# Patient Record
Sex: MALE | Race: WHITE | NOT HISPANIC OR LATINO
[De-identification: names, ages, dates, MRNs, and addresses within clinical notes are randomized per-mention and may not be internally consistent; named-entity substitution may affect disease eponyms.]

---

## 2020-02-10 PROBLEM — Z00.00 ENCOUNTER FOR PREVENTIVE HEALTH EXAMINATION: Status: ACTIVE | Noted: 2020-02-10

## 2020-02-27 ENCOUNTER — APPOINTMENT (OUTPATIENT)
Dept: GASTROENTEROLOGY | Facility: CLINIC | Age: 75
End: 2020-02-27

## 2020-08-27 ENCOUNTER — OUTPATIENT (OUTPATIENT)
Dept: OUTPATIENT SERVICES | Facility: HOSPITAL | Age: 75
LOS: 1 days | Discharge: HOME | End: 2020-08-27
Payer: MEDICARE

## 2020-08-27 ENCOUNTER — RESULT REVIEW (OUTPATIENT)
Age: 75
End: 2020-08-27

## 2020-08-27 VITALS
DIASTOLIC BLOOD PRESSURE: 83 MMHG | TEMPERATURE: 98 F | OXYGEN SATURATION: 96 % | HEIGHT: 67 IN | RESPIRATION RATE: 16 BRPM | SYSTOLIC BLOOD PRESSURE: 135 MMHG | WEIGHT: 186.07 LBS | HEART RATE: 68 BPM

## 2020-08-27 DIAGNOSIS — Z98.890 OTHER SPECIFIED POSTPROCEDURAL STATES: Chronic | ICD-10-CM

## 2020-08-27 DIAGNOSIS — Z01.818 ENCOUNTER FOR OTHER PREPROCEDURAL EXAMINATION: ICD-10-CM

## 2020-08-27 DIAGNOSIS — M75.101 UNSPECIFIED ROTATOR CUFF TEAR OR RUPTURE OF RIGHT SHOULDER, NOT SPECIFIED AS TRAUMATIC: ICD-10-CM

## 2020-08-27 LAB
ALBUMIN SERPL ELPH-MCNC: 4.6 G/DL — SIGNIFICANT CHANGE UP (ref 3.5–5.2)
ALP SERPL-CCNC: 52 U/L — SIGNIFICANT CHANGE UP (ref 30–115)
ALT FLD-CCNC: 9 U/L — SIGNIFICANT CHANGE UP (ref 0–41)
ANION GAP SERPL CALC-SCNC: 12 MMOL/L — SIGNIFICANT CHANGE UP (ref 7–14)
APTT BLD: 28 SEC — SIGNIFICANT CHANGE UP (ref 27–39.2)
AST SERPL-CCNC: 14 U/L — SIGNIFICANT CHANGE UP (ref 0–41)
BASOPHILS # BLD AUTO: 0.06 K/UL — SIGNIFICANT CHANGE UP (ref 0–0.2)
BASOPHILS NFR BLD AUTO: 1.4 % — HIGH (ref 0–1)
BILIRUB SERPL-MCNC: 0.7 MG/DL — SIGNIFICANT CHANGE UP (ref 0.2–1.2)
BUN SERPL-MCNC: 15 MG/DL — SIGNIFICANT CHANGE UP (ref 10–20)
CALCIUM SERPL-MCNC: 9.4 MG/DL — SIGNIFICANT CHANGE UP (ref 8.5–10.1)
CHLORIDE SERPL-SCNC: 102 MMOL/L — SIGNIFICANT CHANGE UP (ref 98–110)
CO2 SERPL-SCNC: 24 MMOL/L — SIGNIFICANT CHANGE UP (ref 17–32)
CREAT SERPL-MCNC: 0.7 MG/DL — SIGNIFICANT CHANGE UP (ref 0.7–1.5)
EOSINOPHIL # BLD AUTO: 0.26 K/UL — SIGNIFICANT CHANGE UP (ref 0–0.7)
EOSINOPHIL NFR BLD AUTO: 5.9 % — SIGNIFICANT CHANGE UP (ref 0–8)
GLUCOSE SERPL-MCNC: 79 MG/DL — SIGNIFICANT CHANGE UP (ref 70–99)
HCT VFR BLD CALC: 48.1 % — SIGNIFICANT CHANGE UP (ref 42–52)
HGB BLD-MCNC: 15.7 G/DL — SIGNIFICANT CHANGE UP (ref 14–18)
IMM GRANULOCYTES NFR BLD AUTO: 0.2 % — SIGNIFICANT CHANGE UP (ref 0.1–0.3)
INR BLD: 0.98 RATIO — SIGNIFICANT CHANGE UP (ref 0.65–1.3)
LYMPHOCYTES # BLD AUTO: 0.73 K/UL — LOW (ref 1.2–3.4)
LYMPHOCYTES # BLD AUTO: 16.6 % — LOW (ref 20.5–51.1)
MCHC RBC-ENTMCNC: 29.7 PG — SIGNIFICANT CHANGE UP (ref 27–31)
MCHC RBC-ENTMCNC: 32.6 G/DL — SIGNIFICANT CHANGE UP (ref 32–37)
MCV RBC AUTO: 91.1 FL — SIGNIFICANT CHANGE UP (ref 80–94)
MONOCYTES # BLD AUTO: 0.37 K/UL — SIGNIFICANT CHANGE UP (ref 0.1–0.6)
MONOCYTES NFR BLD AUTO: 8.4 % — SIGNIFICANT CHANGE UP (ref 1.7–9.3)
NEUTROPHILS # BLD AUTO: 2.96 K/UL — SIGNIFICANT CHANGE UP (ref 1.4–6.5)
NEUTROPHILS NFR BLD AUTO: 67.5 % — SIGNIFICANT CHANGE UP (ref 42.2–75.2)
NRBC # BLD: 0 /100 WBCS — SIGNIFICANT CHANGE UP (ref 0–0)
PLATELET # BLD AUTO: 176 K/UL — SIGNIFICANT CHANGE UP (ref 130–400)
POTASSIUM SERPL-MCNC: 4.6 MMOL/L — SIGNIFICANT CHANGE UP (ref 3.5–5)
POTASSIUM SERPL-SCNC: 4.6 MMOL/L — SIGNIFICANT CHANGE UP (ref 3.5–5)
PROT SERPL-MCNC: 6.7 G/DL — SIGNIFICANT CHANGE UP (ref 6–8)
PROTHROM AB SERPL-ACNC: 11.3 SEC — SIGNIFICANT CHANGE UP (ref 9.95–12.87)
RBC # BLD: 5.28 M/UL — SIGNIFICANT CHANGE UP (ref 4.7–6.1)
RBC # FLD: 13.2 % — SIGNIFICANT CHANGE UP (ref 11.5–14.5)
SODIUM SERPL-SCNC: 138 MMOL/L — SIGNIFICANT CHANGE UP (ref 135–146)
WBC # BLD: 4.39 K/UL — LOW (ref 4.8–10.8)
WBC # FLD AUTO: 4.39 K/UL — LOW (ref 4.8–10.8)

## 2020-08-27 PROCEDURE — 93010 ELECTROCARDIOGRAM REPORT: CPT

## 2020-08-27 PROCEDURE — 71046 X-RAY EXAM CHEST 2 VIEWS: CPT | Mod: 26

## 2020-08-27 NOTE — H&P PST ADULT - NSICDXPASTMEDICALHX_GEN_ALL_CORE_FT
PAST MEDICAL HISTORY:  Back pain herniated disc    Grant esophagus     HTN (hypertension)     Kidney stone

## 2020-08-27 NOTE — H&P PST ADULT - HISTORY OF PRESENT ILLNESS
Pt states he has multiple tear in rotator cuff which causes him pain. Denies any chest pain, difficulty breathing, SOB, palpitations, dysuria, URI, or any other infections in the last 2 weeks. Denies any recent travel, contact, or exposure to any persons with known or suspected COVID-19. Pt also denies COVID testing within the last 2 weeks. Pt advised to self quarantine until day of procedure. Exercise tolerance of 1-2 flights of stairs without dyspnea. NOÉ reviewed with patient.     Anesthesia Alert  NO--Difficult Airway  NO--History of neck surgery or radiation  NO--Limited ROM of neck  NO--History of Malignant hyperthermia  NO--No personal or family history of Pseudocholinesterase deficiency.  NO--Prior Anesthesia Complication  NO--Latex Allergy  NO--Loose teeth  NO--History of Rheumatoid Arthritis  NO--NOÉ  NO--Other_____

## 2020-08-27 NOTE — H&P PST ADULT - NSANTHOSAYNRD_GEN_A_CORE
No. NOÉ screening performed.  STOP BANG Legend: 0-2 = LOW Risk; 3-4 = INTERMEDIATE Risk; 5-8 = HIGH Risk

## 2020-08-27 NOTE — H&P PST ADULT - REASON FOR ADMISSION
76 yo male presents for PAST in preparation for right shoulder arthroscopy decompression debridement possible rotator cuff repair possible open bicep tenodesis on 9/10/2020.

## 2020-09-07 ENCOUNTER — OUTPATIENT (OUTPATIENT)
Dept: OUTPATIENT SERVICES | Facility: HOSPITAL | Age: 75
LOS: 1 days | Discharge: HOME | End: 2020-09-07

## 2020-09-07 DIAGNOSIS — Z98.890 OTHER SPECIFIED POSTPROCEDURAL STATES: Chronic | ICD-10-CM

## 2020-09-07 DIAGNOSIS — Z11.59 ENCOUNTER FOR SCREENING FOR OTHER VIRAL DISEASES: ICD-10-CM

## 2020-09-07 PROBLEM — K22.70 BARRETT'S ESOPHAGUS WITHOUT DYSPLASIA: Chronic | Status: ACTIVE | Noted: 2020-08-27

## 2020-09-07 PROBLEM — I10 ESSENTIAL (PRIMARY) HYPERTENSION: Chronic | Status: ACTIVE | Noted: 2020-08-27

## 2020-09-07 PROBLEM — M54.9 DORSALGIA, UNSPECIFIED: Chronic | Status: ACTIVE | Noted: 2020-08-27

## 2020-09-07 PROBLEM — N20.0 CALCULUS OF KIDNEY: Chronic | Status: ACTIVE | Noted: 2020-08-27

## 2020-09-10 ENCOUNTER — RESULT REVIEW (OUTPATIENT)
Age: 75
End: 2020-09-10

## 2020-09-10 ENCOUNTER — OUTPATIENT (OUTPATIENT)
Dept: OUTPATIENT SERVICES | Facility: HOSPITAL | Age: 75
LOS: 1 days | Discharge: HOME | End: 2020-09-10
Payer: MEDICARE

## 2020-09-10 VITALS
WEIGHT: 186.07 LBS | SYSTOLIC BLOOD PRESSURE: 147 MMHG | OXYGEN SATURATION: 100 % | TEMPERATURE: 98 F | RESPIRATION RATE: 18 BRPM | HEART RATE: 63 BPM | DIASTOLIC BLOOD PRESSURE: 81 MMHG | HEIGHT: 67 IN

## 2020-09-10 VITALS
SYSTOLIC BLOOD PRESSURE: 119 MMHG | HEART RATE: 67 BPM | DIASTOLIC BLOOD PRESSURE: 58 MMHG | RESPIRATION RATE: 22 BRPM | OXYGEN SATURATION: 97 %

## 2020-09-10 DIAGNOSIS — Z98.890 OTHER SPECIFIED POSTPROCEDURAL STATES: Chronic | ICD-10-CM

## 2020-09-10 PROCEDURE — 88304 TISSUE EXAM BY PATHOLOGIST: CPT | Mod: 26

## 2020-09-10 RX ORDER — ONDANSETRON 8 MG/1
4 TABLET, FILM COATED ORAL ONCE
Refills: 0 | Status: DISCONTINUED | OUTPATIENT
Start: 2020-09-10 | End: 2020-09-24

## 2020-09-10 RX ORDER — AMLODIPINE BESYLATE AND BENAZEPRIL HYDROCHLORIDE 10; 20 MG/1; MG/1
1 CAPSULE ORAL
Qty: 0 | Refills: 0 | DISCHARGE

## 2020-09-10 RX ORDER — ZOLPIDEM TARTRATE 10 MG/1
1 TABLET ORAL
Qty: 0 | Refills: 0 | DISCHARGE

## 2020-09-10 RX ORDER — FINASTERIDE 5 MG/1
1 TABLET, FILM COATED ORAL
Qty: 0 | Refills: 0 | DISCHARGE

## 2020-09-10 RX ORDER — TAMSULOSIN HYDROCHLORIDE 0.4 MG/1
1 CAPSULE ORAL
Qty: 0 | Refills: 0 | DISCHARGE

## 2020-09-10 RX ORDER — FAMOTIDINE 10 MG/ML
1 INJECTION INTRAVENOUS
Qty: 0 | Refills: 0 | DISCHARGE

## 2020-09-10 RX ORDER — OXYCODONE AND ACETAMINOPHEN 5; 325 MG/1; MG/1
1 TABLET ORAL EVERY 4 HOURS
Refills: 0 | Status: DISCONTINUED | OUTPATIENT
Start: 2020-09-10 | End: 2020-09-10

## 2020-09-10 RX ORDER — LANSOPRAZOLE 15 MG/1
1 CAPSULE, DELAYED RELEASE ORAL
Qty: 0 | Refills: 0 | DISCHARGE

## 2020-09-10 RX ORDER — MIRABEGRON 50 MG/1
1 TABLET, EXTENDED RELEASE ORAL
Qty: 0 | Refills: 0 | DISCHARGE

## 2020-09-10 RX ORDER — HYDROMORPHONE HYDROCHLORIDE 2 MG/ML
0.5 INJECTION INTRAMUSCULAR; INTRAVENOUS; SUBCUTANEOUS
Refills: 0 | Status: DISCONTINUED | OUTPATIENT
Start: 2020-09-10 | End: 2020-09-24

## 2020-09-10 RX ORDER — ESCITALOPRAM OXALATE 10 MG/1
1 TABLET, FILM COATED ORAL
Qty: 0 | Refills: 0 | DISCHARGE

## 2020-09-10 RX ORDER — HYDROMORPHONE HYDROCHLORIDE 2 MG/ML
0.5 INJECTION INTRAMUSCULAR; INTRAVENOUS; SUBCUTANEOUS
Refills: 0 | Status: DISCONTINUED | OUTPATIENT
Start: 2020-09-10 | End: 2020-09-10

## 2020-09-10 RX ORDER — SODIUM CHLORIDE 9 MG/ML
1000 INJECTION, SOLUTION INTRAVENOUS
Refills: 0 | Status: DISCONTINUED | OUTPATIENT
Start: 2020-09-10 | End: 2020-09-24

## 2020-09-10 RX ADMIN — SODIUM CHLORIDE 100 MILLILITER(S): 9 INJECTION, SOLUTION INTRAVENOUS at 10:28

## 2020-09-10 RX ADMIN — OXYCODONE AND ACETAMINOPHEN 1 TABLET(S): 5; 325 TABLET ORAL at 13:09

## 2020-09-10 RX ADMIN — HYDROMORPHONE HYDROCHLORIDE 0.5 MILLIGRAM(S): 2 INJECTION INTRAMUSCULAR; INTRAVENOUS; SUBCUTANEOUS at 13:10

## 2020-09-10 RX ADMIN — HYDROMORPHONE HYDROCHLORIDE 0.5 MILLIGRAM(S): 2 INJECTION INTRAMUSCULAR; INTRAVENOUS; SUBCUTANEOUS at 10:48

## 2020-09-10 RX ADMIN — HYDROMORPHONE HYDROCHLORIDE 0.5 MILLIGRAM(S): 2 INJECTION INTRAMUSCULAR; INTRAVENOUS; SUBCUTANEOUS at 11:08

## 2020-09-10 NOTE — ASU DISCHARGE PLAN (ADULT/PEDIATRIC) - CARE PROVIDER_API CALL
Trevon Baez  Orthopaedic Surgery  [address] [place] [address]  Phone: [phone]  Fax: [phone]  Follow Up Time: 1 week

## 2020-09-10 NOTE — ASU DISCHARGE PLAN (ADULT/PEDIATRIC) - ASU DC SPECIAL INSTRUCTIONSFT
Post -Operative Shoulder Arthroscopy Instructions    Anesthesia:  - No alcoholic beverages, including beer and wine, for 24 hours or while on presecribed pain medications  - Do not make any important decisions or sign any legal documents  - Do not drive or operate machinary for 24 hours  - You are required upon discharge to leave the surgical center with a responsible adult who will drive you home    Surgery:  - Keep sling on until being seen in office.  You can move fingers, wrist and elbow while in sling  - Keep clean and dry for 3 days  - Remove dressing in 3 days and cover wounds with band-aids, you can then shower  - Take pain medication as prescribed  - Resume regular diet  - Follow-up in approximately 1 week    FOR QUESTIONS OR CONCERNS, CALL [phone]    Notify your doctor if you develop: fever, chills, excessive swelling, drainage, pain not controlled by pain medication, persistent numbness in hand or fingers     IF AN EMERGENCY ARISES , CALL 911 AND/OR GO TO THE EMERGENCY ROOM    Dr. Baez  [phone]

## 2020-09-10 NOTE — PRE-ANESTHESIA EVALUATION ADULT - NSANTHADDINFOFT_GEN_ALL_CORE
Procedure/Risks explained for GA with ETT + routine monitoring. Patient understands stated anesthetic plan and agrees to proceed.

## 2020-09-13 DIAGNOSIS — M25.811 OTHER SPECIFIED JOINT DISORDERS, RIGHT SHOULDER: ICD-10-CM

## 2020-09-13 DIAGNOSIS — M75.121 COMPLETE ROTATOR CUFF TEAR OR RUPTURE OF RIGHT SHOULDER, NOT SPECIFIED AS TRAUMATIC: ICD-10-CM

## 2020-09-13 DIAGNOSIS — Z87.891 PERSONAL HISTORY OF NICOTINE DEPENDENCE: ICD-10-CM

## 2020-09-13 DIAGNOSIS — Z87.442 PERSONAL HISTORY OF URINARY CALCULI: ICD-10-CM

## 2020-09-13 DIAGNOSIS — Z88.6 ALLERGY STATUS TO ANALGESIC AGENT: ICD-10-CM

## 2020-09-13 DIAGNOSIS — M65.811 OTHER SYNOVITIS AND TENOSYNOVITIS, RIGHT SHOULDER: ICD-10-CM

## 2020-09-13 DIAGNOSIS — M24.111 OTHER ARTICULAR CARTILAGE DISORDERS, RIGHT SHOULDER: ICD-10-CM

## 2020-09-13 DIAGNOSIS — I10 ESSENTIAL (PRIMARY) HYPERTENSION: ICD-10-CM

## 2020-09-14 LAB — SURGICAL PATHOLOGY STUDY: SIGNIFICANT CHANGE UP

## 2021-10-11 ENCOUNTER — INPATIENT (INPATIENT)
Facility: HOSPITAL | Age: 76
LOS: 5 days | Discharge: HOME HEALTH | End: 2021-10-17
Attending: STUDENT IN AN ORGANIZED HEALTH CARE EDUCATION/TRAINING PROGRAM | Admitting: STUDENT IN AN ORGANIZED HEALTH CARE EDUCATION/TRAINING PROGRAM
Payer: MEDICARE

## 2021-10-11 VITALS
HEART RATE: 75 BPM | OXYGEN SATURATION: 95 % | WEIGHT: 190.04 LBS | SYSTOLIC BLOOD PRESSURE: 95 MMHG | RESPIRATION RATE: 20 BRPM | TEMPERATURE: 98 F | DIASTOLIC BLOOD PRESSURE: 53 MMHG | HEIGHT: 67 IN

## 2021-10-11 DIAGNOSIS — Z98.890 OTHER SPECIFIED POSTPROCEDURAL STATES: Chronic | ICD-10-CM

## 2021-10-11 LAB
ALBUMIN SERPL ELPH-MCNC: 3.6 G/DL — SIGNIFICANT CHANGE UP (ref 3.5–5.2)
ALP SERPL-CCNC: 49 U/L — SIGNIFICANT CHANGE UP (ref 30–115)
ALT FLD-CCNC: 25 U/L — SIGNIFICANT CHANGE UP (ref 0–41)
ANION GAP SERPL CALC-SCNC: 16 MMOL/L — HIGH (ref 7–14)
AST SERPL-CCNC: 52 U/L — HIGH (ref 0–41)
BASOPHILS # BLD AUTO: 0.01 K/UL — SIGNIFICANT CHANGE UP (ref 0–0.2)
BASOPHILS NFR BLD AUTO: 0.1 % — SIGNIFICANT CHANGE UP (ref 0–1)
BILIRUB SERPL-MCNC: 0.6 MG/DL — SIGNIFICANT CHANGE UP (ref 0.2–1.2)
BUN SERPL-MCNC: 35 MG/DL — HIGH (ref 10–20)
CALCIUM SERPL-MCNC: 8.4 MG/DL — LOW (ref 8.5–10.1)
CHLORIDE SERPL-SCNC: 95 MMOL/L — LOW (ref 98–110)
CO2 SERPL-SCNC: 22 MMOL/L — SIGNIFICANT CHANGE UP (ref 17–32)
CREAT SERPL-MCNC: 1.4 MG/DL — SIGNIFICANT CHANGE UP (ref 0.7–1.5)
D DIMER BLD IA.RAPID-MCNC: 135 NG/ML DDU — SIGNIFICANT CHANGE UP (ref 0–230)
EOSINOPHIL # BLD AUTO: 0 K/UL — SIGNIFICANT CHANGE UP (ref 0–0.7)
EOSINOPHIL NFR BLD AUTO: 0 % — SIGNIFICANT CHANGE UP (ref 0–8)
GLUCOSE SERPL-MCNC: 124 MG/DL — HIGH (ref 70–99)
HCT VFR BLD CALC: 45.1 % — SIGNIFICANT CHANGE UP (ref 42–52)
HGB BLD-MCNC: 15.4 G/DL — SIGNIFICANT CHANGE UP (ref 14–18)
IMM GRANULOCYTES NFR BLD AUTO: 0.5 % — HIGH (ref 0.1–0.3)
LYMPHOCYTES # BLD AUTO: 0.32 K/UL — LOW (ref 1.2–3.4)
LYMPHOCYTES # BLD AUTO: 3.8 % — LOW (ref 20.5–51.1)
MCHC RBC-ENTMCNC: 29.6 PG — SIGNIFICANT CHANGE UP (ref 27–31)
MCHC RBC-ENTMCNC: 34.1 G/DL — SIGNIFICANT CHANGE UP (ref 32–37)
MCV RBC AUTO: 86.7 FL — SIGNIFICANT CHANGE UP (ref 80–94)
MONOCYTES # BLD AUTO: 0.27 K/UL — SIGNIFICANT CHANGE UP (ref 0.1–0.6)
MONOCYTES NFR BLD AUTO: 3.2 % — SIGNIFICANT CHANGE UP (ref 1.7–9.3)
NEUTROPHILS # BLD AUTO: 7.81 K/UL — HIGH (ref 1.4–6.5)
NEUTROPHILS NFR BLD AUTO: 92.4 % — HIGH (ref 42.2–75.2)
NRBC # BLD: 0 /100 WBCS — SIGNIFICANT CHANGE UP (ref 0–0)
PLATELET # BLD AUTO: 137 K/UL — SIGNIFICANT CHANGE UP (ref 130–400)
POTASSIUM SERPL-MCNC: 3.9 MMOL/L — SIGNIFICANT CHANGE UP (ref 3.5–5)
POTASSIUM SERPL-SCNC: 3.9 MMOL/L — SIGNIFICANT CHANGE UP (ref 3.5–5)
PROT SERPL-MCNC: 6.2 G/DL — SIGNIFICANT CHANGE UP (ref 6–8)
RBC # BLD: 5.2 M/UL — SIGNIFICANT CHANGE UP (ref 4.7–6.1)
RBC # FLD: 13.3 % — SIGNIFICANT CHANGE UP (ref 11.5–14.5)
SARS-COV-2 RNA SPEC QL NAA+PROBE: DETECTED
SODIUM SERPL-SCNC: 133 MMOL/L — LOW (ref 135–146)
WBC # BLD: 8.45 K/UL — SIGNIFICANT CHANGE UP (ref 4.8–10.8)
WBC # FLD AUTO: 8.45 K/UL — SIGNIFICANT CHANGE UP (ref 4.8–10.8)

## 2021-10-11 PROCEDURE — 99285 EMERGENCY DEPT VISIT HI MDM: CPT | Mod: CS,GC

## 2021-10-11 PROCEDURE — 71045 X-RAY EXAM CHEST 1 VIEW: CPT | Mod: 26

## 2021-10-11 PROCEDURE — 93010 ELECTROCARDIOGRAM REPORT: CPT

## 2021-10-11 NOTE — ED PROVIDER NOTE - PHYSICAL EXAMINATION
CONSTITUTIONAL: Well-developed; well-nourished; in no acute distress.   SKIN: warm, dry  HEAD: Normocephalic; atraumatic.  EYES: no conjunctival injection. PERRL.   ENT: No nasal discharge; airway clear.  NECK: Supple; non tender.  CARD: S1, S2 normal; no murmurs, gallops, or rubs. Regular rate and rhythm.   RESP: B/l crackles  ABD: soft ntnd  EXT: Normal ROM.  No clubbing, cyanosis or edema.   LYMPH: No acute cervical adenopathy.  NEURO: Alert, oriented, grossly unremarkable  PSYCH: Cooperative, appropriate.

## 2021-10-11 NOTE — ED PROVIDER NOTE - OBJECTIVE STATEMENT
76y M pmh HTN presenting with hypoxia. Positive COVID dxed on Wednesday. +diarrhea +dizziness +Fevers +weakness all started October 2nd. Checked pulse ox today at rest and it was in the low 80s. No leg swelling. No chest pain or shortness of breath. No n/v. No abdominal pain. Former smoker. Fully vaccinated since March 2021. +cough

## 2021-10-11 NOTE — ED PROVIDER NOTE - NS ED ROS FT
Eyes:  No visual changes, eye pain or discharge.  ENMT:  No hearing changes, pain, no sore throat or runny nose, no difficulty swallowing  Cardiac:  No chest pain, SOB or edema. No chest pain with exertion.  Respiratory:  see HPI  GI:  see HPI  :  No dysuria, frequency or burning.  MS: see HPI  Neuro: see HPI  Skin:  No skin rash.   Endocrine: No history of thyroid disease or diabetes.

## 2021-10-12 LAB
ALBUMIN SERPL ELPH-MCNC: 3.6 G/DL — SIGNIFICANT CHANGE UP (ref 3.5–5.2)
ALP SERPL-CCNC: 48 U/L — SIGNIFICANT CHANGE UP (ref 30–115)
ALT FLD-CCNC: 23 U/L — SIGNIFICANT CHANGE UP (ref 0–41)
ANION GAP SERPL CALC-SCNC: 16 MMOL/L — HIGH (ref 7–14)
ANISOCYTOSIS BLD QL: SLIGHT — SIGNIFICANT CHANGE UP
AST SERPL-CCNC: 44 U/L — HIGH (ref 0–41)
BASOPHILS # BLD AUTO: 0 K/UL — SIGNIFICANT CHANGE UP (ref 0–0.2)
BASOPHILS NFR BLD AUTO: 0 % — SIGNIFICANT CHANGE UP (ref 0–1)
BILIRUB SERPL-MCNC: 0.6 MG/DL — SIGNIFICANT CHANGE UP (ref 0.2–1.2)
BUN SERPL-MCNC: 34 MG/DL — HIGH (ref 10–20)
BURR CELLS BLD QL SMEAR: PRESENT — SIGNIFICANT CHANGE UP
CALCIUM SERPL-MCNC: 8.4 MG/DL — LOW (ref 8.5–10.1)
CHLORIDE SERPL-SCNC: 99 MMOL/L — SIGNIFICANT CHANGE UP (ref 98–110)
CO2 SERPL-SCNC: 21 MMOL/L — SIGNIFICANT CHANGE UP (ref 17–32)
CREAT SERPL-MCNC: 1 MG/DL — SIGNIFICANT CHANGE UP (ref 0.7–1.5)
CRP SERPL-MCNC: 273 MG/L — HIGH
EOSINOPHIL # BLD AUTO: 0.06 K/UL — SIGNIFICANT CHANGE UP (ref 0–0.7)
EOSINOPHIL NFR BLD AUTO: 0.9 % — SIGNIFICANT CHANGE UP (ref 0–8)
FERRITIN SERPL-MCNC: 1545 NG/ML — HIGH (ref 30–400)
GIANT PLATELETS BLD QL SMEAR: PRESENT — SIGNIFICANT CHANGE UP
GLUCOSE SERPL-MCNC: 103 MG/DL — HIGH (ref 70–99)
HCT VFR BLD CALC: 44.9 % — SIGNIFICANT CHANGE UP (ref 42–52)
HGB BLD-MCNC: 15.3 G/DL — SIGNIFICANT CHANGE UP (ref 14–18)
LYMPHOCYTES # BLD AUTO: 0.38 K/UL — LOW (ref 1.2–3.4)
LYMPHOCYTES # BLD AUTO: 5.9 % — LOW (ref 20.5–51.1)
MAGNESIUM SERPL-MCNC: 2.7 MG/DL — HIGH (ref 1.8–2.4)
MANUAL SMEAR VERIFICATION: SIGNIFICANT CHANGE UP
MCHC RBC-ENTMCNC: 29.7 PG — SIGNIFICANT CHANGE UP (ref 27–31)
MCHC RBC-ENTMCNC: 34.1 G/DL — SIGNIFICANT CHANGE UP (ref 32–37)
MCV RBC AUTO: 87 FL — SIGNIFICANT CHANGE UP (ref 80–94)
MICROCYTES BLD QL: SLIGHT — SIGNIFICANT CHANGE UP
MONOCYTES # BLD AUTO: 0.11 K/UL — SIGNIFICANT CHANGE UP (ref 0.1–0.6)
MONOCYTES NFR BLD AUTO: 1.7 % — SIGNIFICANT CHANGE UP (ref 1.7–9.3)
NEUTROPHILS # BLD AUTO: 5.92 K/UL — SIGNIFICANT CHANGE UP (ref 1.4–6.5)
NEUTROPHILS NFR BLD AUTO: 91.5 % — HIGH (ref 42.2–75.2)
OVALOCYTES BLD QL SMEAR: SIGNIFICANT CHANGE UP
PLAT MORPH BLD: NORMAL — SIGNIFICANT CHANGE UP
PLATELET # BLD AUTO: 141 K/UL — SIGNIFICANT CHANGE UP (ref 130–400)
POIKILOCYTOSIS BLD QL AUTO: SIGNIFICANT CHANGE UP
POLYCHROMASIA BLD QL SMEAR: SLIGHT — SIGNIFICANT CHANGE UP
POTASSIUM SERPL-MCNC: 3.8 MMOL/L — SIGNIFICANT CHANGE UP (ref 3.5–5)
POTASSIUM SERPL-SCNC: 3.8 MMOL/L — SIGNIFICANT CHANGE UP (ref 3.5–5)
PROCALCITONIN SERPL-MCNC: 0.56 NG/ML — HIGH (ref 0.02–0.1)
PROT SERPL-MCNC: 6.1 G/DL — SIGNIFICANT CHANGE UP (ref 6–8)
RBC # BLD: 5.16 M/UL — SIGNIFICANT CHANGE UP (ref 4.7–6.1)
RBC # FLD: 13.2 % — SIGNIFICANT CHANGE UP (ref 11.5–14.5)
RBC BLD AUTO: ABNORMAL
SMUDGE CELLS # BLD: PRESENT — SIGNIFICANT CHANGE UP
SODIUM SERPL-SCNC: 136 MMOL/L — SIGNIFICANT CHANGE UP (ref 135–146)
WBC # BLD: 6.47 K/UL — SIGNIFICANT CHANGE UP (ref 4.8–10.8)
WBC # FLD AUTO: 6.47 K/UL — SIGNIFICANT CHANGE UP (ref 4.8–10.8)

## 2021-10-12 PROCEDURE — 99221 1ST HOSP IP/OBS SF/LOW 40: CPT | Mod: GC

## 2021-10-12 RX ORDER — ZOLPIDEM TARTRATE 10 MG/1
5 TABLET ORAL AT BEDTIME
Refills: 0 | Status: DISCONTINUED | OUTPATIENT
Start: 2021-10-12 | End: 2021-10-12

## 2021-10-12 RX ORDER — DEXAMETHASONE 0.5 MG/5ML
6 ELIXIR ORAL DAILY
Refills: 0 | Status: DISCONTINUED | OUTPATIENT
Start: 2021-10-12 | End: 2021-10-17

## 2021-10-12 RX ORDER — ZOLPIDEM TARTRATE 10 MG/1
5 TABLET ORAL AT BEDTIME
Refills: 0 | Status: DISCONTINUED | OUTPATIENT
Start: 2021-10-12 | End: 2021-10-13

## 2021-10-12 RX ORDER — ACETAMINOPHEN 500 MG
650 TABLET ORAL ONCE
Refills: 0 | Status: COMPLETED | OUTPATIENT
Start: 2021-10-12 | End: 2021-10-12

## 2021-10-12 RX ORDER — ENOXAPARIN SODIUM 100 MG/ML
40 INJECTION SUBCUTANEOUS
Refills: 0 | Status: DISCONTINUED | OUTPATIENT
Start: 2021-10-12 | End: 2021-10-17

## 2021-10-12 RX ORDER — PANTOPRAZOLE SODIUM 20 MG/1
40 TABLET, DELAYED RELEASE ORAL
Refills: 0 | Status: DISCONTINUED | OUTPATIENT
Start: 2021-10-12 | End: 2021-10-17

## 2021-10-12 RX ADMIN — ZOLPIDEM TARTRATE 5 MILLIGRAM(S): 10 TABLET ORAL at 02:48

## 2021-10-12 RX ADMIN — Medication 650 MILLIGRAM(S): at 19:51

## 2021-10-12 RX ADMIN — Medication 6 MILLIGRAM(S): at 05:57

## 2021-10-12 RX ADMIN — ENOXAPARIN SODIUM 40 MILLIGRAM(S): 100 INJECTION SUBCUTANEOUS at 17:32

## 2021-10-12 RX ADMIN — PANTOPRAZOLE SODIUM 40 MILLIGRAM(S): 20 TABLET, DELAYED RELEASE ORAL at 05:57

## 2021-10-12 NOTE — H&P ADULT - NSHPPHYSICALEXAM_GEN_ALL_CORE
PHYSICAL EXAM:  GENERAL: NAD, lying in bed comfortably  HEAD:  Atraumatic, Normocephalic  EYES: EOMI, PERRLA, conjunctiva and sclera clear  ENT: Moist mucous membranes  NECK: Supple, No JVD  CHEST/LUNG: dec b/l bs  HEART: Regular rate and rhythm; No murmurs, rubs, or gallops  ABDOMEN: Bowel sounds present; Soft, Nontender, Nondistended. No hepatomegaly  EXTREMITIES:  2+ Peripheral Pulses, brisk capillary refill. No clubbing, cyanosis, or edema  NERVOUS SYSTEM:  Alert & Oriented X3, speech clear. No deficits   MSK: FROM all 4 extremities, full and equal strength  SKIN: No rashes or lesions

## 2021-10-12 NOTE — H&P ADULT - ATTENDING COMMENTS
HPI:  77 y/o M w/ PMHx of HTN presenting to ED for Hypoxia. Patient endorses Fever, diarrhea, dizziness, weakness which all started October 2nd. Additionally patient was experiencing a non-productive Checked pulse ox today at rest and it was in the low 80s so went to  and Tested Positive for COVID on Wednesday. Patient states he is fully vaccinated since March 2021 Denies in CP, leg swelling, shortness of breath, n/v, abdominal pain.     In ED, VS sig for BP of 95/53, On 95 on 10L NRB, Afebrile, Labs sig for Na of 133, Cr of 1.4, No leukocytosis, COVID PCR Positive, CXR sig for Patchy b/l opacities. Began to desat on NRB, placed on HFNC 50L, 60 FiO2, Approved for admission to SDU   (12 Oct 2021 02:02)    REVIEW OF SYSTEMS:  CONSTITUTIONAL: No weakness, fevers or chills  EYES/ENT: No visual changes;  No vertigo or throat pain   NECK: No pain or stiffness  RESPIRATORY: No cough, wheezing, hemoptysis; No shortness of breath  CARDIOVASCULAR: No chest pain or palpitations  GASTROINTESTINAL: No abdominal or epigastric pain. No nausea, vomiting, or hematemesis; No diarrhea or constipation. No melena or hematochezia.  GENITOURINARY: No dysuria, frequency or hematuria  NEUROLOGICAL: No numbness or weakness  SKIN: No itching, rashes    Physical Exam:  General: WN/WD NAD  Neurology: A&Ox3, nonfocal, follows commands  Eyes: PERRLA/ EOMI  ENT/Neck: Neck supple, trachea midline, No JVD  Respiratory: CTA B/L, No wheezing, rales, rhonchi  CV: Normal rate regular rhythm, S1S2, no murmurs, rubs or gallops  Abdominal: Soft, NT, ND +BS,   Extremities: No edema, + peripheral pulses  Skin: No Rashes, Hematoma, Ecchymosis  Incisions:   Tubes: HPI:  77 y/o M w/ PMHx of HTN presenting to ED for Hypoxia. Patient endorses Fever, diarrhea, dizziness, weakness which all started October 2nd. Additionally patient was experiencing a non-productive Checked pulse ox today at rest and it was in the low 80s so went to  and Tested Positive for COVID on Wednesday. Patient states he is fully vaccinated since March 2021 Denies in CP, leg swelling, shortness of breath, n/v, abdominal pain.     In ED, VS sig for BP of 95/53, On 95 on 10L NRB, Afebrile, Labs sig for Na of 133, Cr of 1.4, No leukocytosis, COVID PCR Positive, CXR sig for Patchy b/l opacities. Began to desat on NRB, placed on HFNC 50L, 60 FiO2, Approved for admission to SDU   (12 Oct 2021 02:02)    REVIEW OF SYSTEMS: see cc/HPI   CONSTITUTIONAL: No weakness, fevers or chills  EYES/ENT: No visual changes;  No vertigo or throat pain   NECK: No pain or stiffness  RESPIRATORY: (+) cough, NO wheezing, NO hemoptysis; (+) shortness of breath, see cc/HPI   CARDIOVASCULAR: No chest pain or palpitations  GASTROINTESTINAL: No abdominal or epigastric pain. No nausea, vomiting, or hematemesis; No diarrhea or constipation. No melena or hematochezia.  GENITOURINARY: No dysuria, frequency or hematuria  NEUROLOGICAL: No numbness or weakness  SKIN: No itching, rashes    Physical Exam:  General: WN/WD NAD  Neurology: A&Ox3, nonfocal, follows commands  Eyes: PERRLA/ EOMI  ENT/Neck: Neck supple, trachea midline, No JVD  Respiratory: B/L decreased breathsounds, No wheezing, rales, rhonchi  CV: Normal rate regular rhythm, S1S2, no murmurs, rubs or gallops  Abdominal: Soft, NT, ND +BS,   Extremities: No edema, + peripheral pulses  Skin: No Rashes, Hematoma, Ecchymosis  Incisions: n/a  Tubes: n/a    A/p  Acute resp failure w/ hypoxemia 2/2 COVID-19 pneumonia   -admit to SDU / Isolation   -O2 via HFNC w/ pulse ox monitoring   -IV decadron   -agree w/ inflammatory markers   -ID eval     DUSTIN on CKD II  -agree w/ hydration and trending Scr   -if not improving or getting worse ---> renal sono /renal lytes     HTN w/ relative hypotension upon admission  -hold Rx for now   -IV fluid     BPH   -Proscar/ Flomax    DVT prophylaxis as per protocol

## 2021-10-12 NOTE — H&P ADULT - ASSESSMENT
77 y/o M w/ PMHx of HTN presenting to ED for Hypoxia.     #COVID-19 Pneumonia  - In ED, VS sig for BP of 95/53, Afebrile, No leukocytosis,  - Initially On 95 on 10L NRB, Began to desat on NRB, placed on HFNC 50L, 60 FiO2,  - COVID PCR Positive  - CXR sig for Patchy b/l opacities  - Start on Decadron 6 mg IV Push for 10 days  - f/u Inflammatory Marker - ProCal, D-Dimer, Ferritin, ESR, CRP, Trend q48-72h  - ID Consult placed    #DUSTIN vs CKD3a  - Cr on admission 1.4, Cr from Aug 2020 0.7  - Start on NS at 75cc/hr x 12 hours  - Trend CMP  - Avoid Nephrotoxic Agents    #Hypertension  - Hypotensive on Admission, BP 95/53  - Hold Home BP meds for now (Norvasc, Benazapril)    #BPH  - c/w Home Finasteride 5 mg PO qD, Tamsulosin 0.4 mg PO qD    #Diet: DASH  #DVT pro: Lovenox  #GI pro: Protonix  #Dispo: SDU   77 y/o M w/ PMHx of HTN presenting to ED for Hypoxia.     #COVID-19 Pneumonia  - In ED, VS sig for BP of 95/53, Afebrile, No leukocytosis,  - Initially On 95 on 10L NRB, Began to desat on NRB, placed on HFNC 50L, 60 FiO2,  - COVID PCR Positive  - CXR sig for Patchy b/l opacities  - Start on Decadron 6 mg IV Push for 10 days  - f/u Inflammatory Marker - ProCal, D-Dimer, Ferritin, ESR, CRP, Trend q48-72h  - ID Consult placed    #DUSTIN vs CKD3a  - Cr on admission 1.4, Cr from Aug 2020 0.7  - Start on NS at 75cc/hr x 12 hours  - Trend CMP  - Avoid Nephrotoxic Agents    #Hypertension  - Hypotensive on Admission, BP 95/53  - Hold Home BP meds for now (Norvasc, Benazapril)    #BPH  - c/w Home Finasteride 5 mg PO qD, Tamsulosin 0.4 mg PO qD    #Diet: DASH  #DVT pro: Lovenox 40 mg BID (BMI 30)  #GI pro: Protonix  #Dispo: SDU   75 y/o M w/ PMHx of HTN presenting to ED for Hypoxia.     #Acute Hypoxic Respiratory Failure 2/2 COVID-19 Pneumonia  - In ED, VS sig for BP of 95/53, Afebrile, No leukocytosis,  - Initially On 95 on 10L NRB, Began to desat on NRB, placed on HFNC 50L, 60 FiO2  - Monitor Pulse Ox, Titrate O2 as needed  - COVID PCR Positive  - CXR sig for Patchy b/l opacities  - Start on Decadron 6 mg IV Push for 10 days  - f/u Inflammatory Marker - ProCal, D-Dimer, Ferritin, ESR, CRP, Trend q48-72h  - Tylenol if develops fever  - ID Consult placed    #DUSTIN vs CKD3a  - Cr on admission 1.4, Cr from Aug 2020 0.7  - Start on NS at 75cc/hr x 12 hours  - Trend CMP  - Avoid Nephrotoxic Agents    #Hypertension  - Hypotensive on Admission, BP 95/53  - Hold Home BP meds for now (Norvasc, Benazapril)    #BPH  - c/w Home Finasteride 5 mg PO qD, Tamsulosin 0.4 mg PO qD    #Diet: DASH  #DVT pro: Lovenox 40 mg BID (BMI 30)  #GI pro: Protonix  #Dispo: SDU

## 2021-10-12 NOTE — H&P ADULT - HISTORY OF PRESENT ILLNESS
77 y/o M w/ PMHx of HTN presenting to ED for Hypoxia. Patient endorses Fever, diarrhea, dizziness, weakness which all started October 2nd. Additionally patient was experiencing a non-productive Checked pulse ox today at rest and it was in the low 80s so went to  and Tested Positive for COVID on Wednesday. Patient states he is fully vaccinated since March 2021 Denies in CP, leg swelling, shortness of breath, n/v, abdominal pain. Former smoker.    In ED, VS sig for BP of 95/53, On 95 on 10L NRB, Afebrile, Labs sig for Na of 133, Cr of 1.4, No leukocytosis, COVID PCR Positive, CXR sig for Patchy b/l opacities. Began to desat on NRB, placed on HFNC 50L, 60 FiO2, Approved for admission to SDU   75 y/o M w/ PMHx of HTN presenting to ED for Hypoxia. Patient endorses Fever, diarrhea, dizziness, weakness which all started October 2nd. Additionally patient was experiencing a non-productive Checked pulse ox today at rest and it was in the low 80s so went to  and Tested Positive for COVID on Wednesday. Patient states he is fully vaccinated since March 2021 Denies in CP, leg swelling, shortness of breath, n/v, abdominal pain.     In ED, VS sig for BP of 95/53, On 95 on 10L NRB, Afebrile, Labs sig for Na of 133, Cr of 1.4, No leukocytosis, COVID PCR Positive, CXR sig for Patchy b/l opacities. Began to desat on NRB, placed on HFNC 50L, 60 FiO2, Approved for admission to SDU

## 2021-10-12 NOTE — CONSULT NOTE ADULT - ASSESSMENT
77 y/o M w/ PMHx of HTN presenting to ED for Hypoxia. Patient endorses Fever, diarrhea, dizziness, weakness which all started October 2nd. Additionally patient was experiencing a non-productive Checked pulse ox today at rest and it was in the low 80s so went to  and Tested Positive for COVID on Wednesday 10/6. Patient states he is fully vaccinated since March 2021 Denies in CP, leg swelling, shortness of breath, n/v, abdominal pain.     In ED : Began to desat on NRB, placed on HFNC 50L, 60 FiO2.    IMPRESSION;  COVID 19 with severe illness. SpO2 < 94% on RA and need for supplemental O2.  Pt is in the late inflammatory response phase ot the illness based on the onset of symptoms.  procalcitonin   Ferritin  CRP  Ddimers 135  CXR GGOP  Based on clinical parameters cytokine storm    RECOMMENDATIONS;  Target SpO2 92 % to 96 %  f/u ferritin, CRP, procalcitonin  Dexamethasone 6 mg iv q24h for 10 days.  Monitor for side effects: hyperglycemia, neurological ( agitation/confusion), adrenal suppression, bacterial and fungal infections  Anticoagulation as per team.   Will give Toci. Timing based on inflammatory markers

## 2021-10-12 NOTE — H&P ADULT - NSHPLABSRESULTS_GEN_ALL_CORE
15.4   8.45  )-----------( 137      ( 11 Oct 2021 20:32 )             45.1       10-11    133<L>  |  95<L>  |  35<H>  ----------------------------<  124<H>  3.9   |  22  |  1.4    Ca    8.4<L>      11 Oct 2021 20:32    TPro  6.2  /  Alb  3.6  /  TBili  0.6  /  DBili  x   /  AST  52<H>  /  ALT  25  /  AlkPhos  49  10-11                            CAPILLARY BLOOD GLUCOSE

## 2021-10-12 NOTE — CONSULT NOTE ADULT - SUBJECTIVE AND OBJECTIVE BOX
HIWOT PAK  76y, Male  Allergy: aspirin (Stomach Upset)  shellfish (Anaphylaxis)      All historical available data reviewed.    HPI:  75 y/o M w/ PMHx of HTN presenting to ED for Hypoxia. Patient endorses Fever, diarrhea, dizziness, weakness which all started October 2nd. Additionally patient was experiencing a non-productive Checked pulse ox today at rest and it was in the low 80s so went to  and Tested Positive for COVID on Wednesday 10/6. Patient states he is fully vaccinated since March 2021 Denies in CP, leg swelling, shortness of breath, n/v, abdominal pain.     In ED, VS sig for BP of 95/53, On 95 on 10L NRB, Afebrile, Labs sig for Na of 133, Cr of 1.4, No leukocytosis, COVID PCR Positive, CXR sig for Patchy b/l opacities. Began to desat on NRB, placed on HFNC 50L, 60 FiO2, Approved for admission to SDU   (12 Oct 2021 02:02)    FAMILY HISTORY:  FH: heart disease      PAST MEDICAL & SURGICAL HISTORY:  HTN (hypertension)    Kidney stone    Grant esophagus    Back pain  herniated disc    H/O arthroscopy of knee    S/P carpal tunnel release          VITALS:  T(F): 97.8, Max: 97.8 (10-11-21 @ 18:50)  HR: 75  BP: 112/61  RR: 20Vital Signs Last 24 Hrs  T(C): 36.6 (11 Oct 2021 18:50), Max: 36.6 (11 Oct 2021 18:50)  T(F): 97.8 (11 Oct 2021 18:50), Max: 97.8 (11 Oct 2021 18:50)  HR: 75 (12 Oct 2021 00:02) (67 - 75)  BP: 112/61 (12 Oct 2021 00:02) (95/53 - 112/61)  BP(mean): --  RR: 20 (11 Oct 2021 20:58) (20 - 20)  SpO2: 95% (12 Oct 2021 00:02) (94% - 97%)    TESTS & MEASUREMENTS:                        15.4   8.45  )-----------( 137      ( 11 Oct 2021 20:32 )             45.1     10-11    133<L>  |  95<L>  |  35<H>  ----------------------------<  124<H>  3.9   |  22  |  1.4    Ca    8.4<L>      11 Oct 2021 20:32    TPro  6.2  /  Alb  3.6  /  TBili  0.6  /  DBili  x   /  AST  52<H>  /  ALT  25  /  AlkPhos  49  10-11    LIVER FUNCTIONS - ( 11 Oct 2021 20:32 )  Alb: 3.6 g/dL / Pro: 6.2 g/dL / ALK PHOS: 49 U/L / ALT: 25 U/L / AST: 52 U/L / GGT: x                   RADIOLOGY & ADDITIONAL TESTS:  Personal review of radiological diagnostics performed  Echo and EKG results noted when applicable.     MEDICATIONS:  dexAMETHasone     Tablet 6 milliGRAM(s) Oral daily  enoxaparin Injectable 40 milliGRAM(s) SubCutaneous two times a day  pantoprazole    Tablet 40 milliGRAM(s) Oral before breakfast      ANTIBIOTICS:

## 2021-10-13 LAB
ALBUMIN SERPL ELPH-MCNC: 3.6 G/DL — SIGNIFICANT CHANGE UP (ref 3.5–5.2)
ALP SERPL-CCNC: 44 U/L — SIGNIFICANT CHANGE UP (ref 30–115)
ALT FLD-CCNC: 30 U/L — SIGNIFICANT CHANGE UP (ref 0–41)
ANION GAP SERPL CALC-SCNC: 16 MMOL/L — HIGH (ref 7–14)
AST SERPL-CCNC: 40 U/L — SIGNIFICANT CHANGE UP (ref 0–41)
BASOPHILS # BLD AUTO: 0.01 K/UL — SIGNIFICANT CHANGE UP (ref 0–0.2)
BASOPHILS NFR BLD AUTO: 0.3 % — SIGNIFICANT CHANGE UP (ref 0–1)
BILIRUB SERPL-MCNC: 0.6 MG/DL — SIGNIFICANT CHANGE UP (ref 0.2–1.2)
BUN SERPL-MCNC: 30 MG/DL — HIGH (ref 10–20)
CALCIUM SERPL-MCNC: 8.4 MG/DL — LOW (ref 8.5–10.1)
CHLORIDE SERPL-SCNC: 102 MMOL/L — SIGNIFICANT CHANGE UP (ref 98–110)
CO2 SERPL-SCNC: 21 MMOL/L — SIGNIFICANT CHANGE UP (ref 17–32)
COVID-19 SPIKE DOMAIN AB INTERP: POSITIVE
COVID-19 SPIKE DOMAIN ANTIBODY RESULT: >250 U/ML — HIGH
CREAT SERPL-MCNC: 0.9 MG/DL — SIGNIFICANT CHANGE UP (ref 0.7–1.5)
EOSINOPHIL # BLD AUTO: 0 K/UL — SIGNIFICANT CHANGE UP (ref 0–0.7)
EOSINOPHIL NFR BLD AUTO: 0 % — SIGNIFICANT CHANGE UP (ref 0–8)
GLUCOSE SERPL-MCNC: 124 MG/DL — HIGH (ref 70–99)
HCT VFR BLD CALC: 46 % — SIGNIFICANT CHANGE UP (ref 42–52)
HGB BLD-MCNC: 15.2 G/DL — SIGNIFICANT CHANGE UP (ref 14–18)
IMM GRANULOCYTES NFR BLD AUTO: 1 % — HIGH (ref 0.1–0.3)
LYMPHOCYTES # BLD AUTO: 0.38 K/UL — LOW (ref 1.2–3.4)
LYMPHOCYTES # BLD AUTO: 9.7 % — LOW (ref 20.5–51.1)
MCHC RBC-ENTMCNC: 29 PG — SIGNIFICANT CHANGE UP (ref 27–31)
MCHC RBC-ENTMCNC: 33 G/DL — SIGNIFICANT CHANGE UP (ref 32–37)
MCV RBC AUTO: 87.8 FL — SIGNIFICANT CHANGE UP (ref 80–94)
MONOCYTES # BLD AUTO: 0.28 K/UL — SIGNIFICANT CHANGE UP (ref 0.1–0.6)
MONOCYTES NFR BLD AUTO: 7.2 % — SIGNIFICANT CHANGE UP (ref 1.7–9.3)
NEUTROPHILS # BLD AUTO: 3.19 K/UL — SIGNIFICANT CHANGE UP (ref 1.4–6.5)
NEUTROPHILS NFR BLD AUTO: 81.8 % — HIGH (ref 42.2–75.2)
NRBC # BLD: 0 /100 WBCS — SIGNIFICANT CHANGE UP (ref 0–0)
PLATELET # BLD AUTO: 158 K/UL — SIGNIFICANT CHANGE UP (ref 130–400)
POTASSIUM SERPL-MCNC: 4.2 MMOL/L — SIGNIFICANT CHANGE UP (ref 3.5–5)
POTASSIUM SERPL-SCNC: 4.2 MMOL/L — SIGNIFICANT CHANGE UP (ref 3.5–5)
PROT SERPL-MCNC: 6.1 G/DL — SIGNIFICANT CHANGE UP (ref 6–8)
RBC # BLD: 5.24 M/UL — SIGNIFICANT CHANGE UP (ref 4.7–6.1)
RBC # FLD: 13.1 % — SIGNIFICANT CHANGE UP (ref 11.5–14.5)
SARS-COV-2 IGG+IGM SERPL QL IA: >250 U/ML — HIGH
SARS-COV-2 IGG+IGM SERPL QL IA: POSITIVE
SODIUM SERPL-SCNC: 139 MMOL/L — SIGNIFICANT CHANGE UP (ref 135–146)
WBC # BLD: 3.9 K/UL — LOW (ref 4.8–10.8)
WBC # FLD AUTO: 3.9 K/UL — LOW (ref 4.8–10.8)

## 2021-10-13 PROCEDURE — 99232 SBSQ HOSP IP/OBS MODERATE 35: CPT

## 2021-10-13 PROCEDURE — 71045 X-RAY EXAM CHEST 1 VIEW: CPT | Mod: 26

## 2021-10-13 RX ORDER — TAMSULOSIN HYDROCHLORIDE 0.4 MG/1
0.4 CAPSULE ORAL AT BEDTIME
Refills: 0 | Status: DISCONTINUED | OUTPATIENT
Start: 2021-10-13 | End: 2021-10-17

## 2021-10-13 RX ORDER — AMLODIPINE BESYLATE 2.5 MG/1
5 TABLET ORAL DAILY
Refills: 0 | Status: DISCONTINUED | OUTPATIENT
Start: 2021-10-13 | End: 2021-10-13

## 2021-10-13 RX ORDER — AMLODIPINE BESYLATE 2.5 MG/1
1 TABLET ORAL
Qty: 0 | Refills: 0 | DISCHARGE

## 2021-10-13 RX ORDER — AMLODIPINE BESYLATE 2.5 MG/1
5 TABLET ORAL DAILY
Refills: 0 | Status: DISCONTINUED | OUTPATIENT
Start: 2021-10-13 | End: 2021-10-17

## 2021-10-13 RX ORDER — LISINOPRIL 2.5 MG/1
20 TABLET ORAL DAILY
Refills: 0 | Status: DISCONTINUED | OUTPATIENT
Start: 2021-10-13 | End: 2021-10-17

## 2021-10-13 RX ORDER — ESCITALOPRAM OXALATE 10 MG/1
20 TABLET, FILM COATED ORAL DAILY
Refills: 0 | Status: DISCONTINUED | OUTPATIENT
Start: 2021-10-13 | End: 2021-10-17

## 2021-10-13 RX ORDER — TOCILIZUMAB 20 MG/ML
800 INJECTION, SOLUTION, CONCENTRATE INTRAVENOUS ONCE
Refills: 0 | Status: COMPLETED | OUTPATIENT
Start: 2021-10-13 | End: 2021-10-13

## 2021-10-13 RX ORDER — FINASTERIDE 5 MG/1
5 TABLET, FILM COATED ORAL DAILY
Refills: 0 | Status: DISCONTINUED | OUTPATIENT
Start: 2021-10-13 | End: 2021-10-17

## 2021-10-13 RX ORDER — LISINOPRIL 2.5 MG/1
1 TABLET ORAL
Qty: 0 | Refills: 0 | DISCHARGE

## 2021-10-13 RX ORDER — FAMOTIDINE 10 MG/ML
40 INJECTION INTRAVENOUS
Refills: 0 | Status: DISCONTINUED | OUTPATIENT
Start: 2021-10-13 | End: 2021-10-17

## 2021-10-13 RX ADMIN — ENOXAPARIN SODIUM 40 MILLIGRAM(S): 100 INJECTION SUBCUTANEOUS at 17:16

## 2021-10-13 RX ADMIN — AMLODIPINE BESYLATE 5 MILLIGRAM(S): 2.5 TABLET ORAL at 22:41

## 2021-10-13 RX ADMIN — PANTOPRAZOLE SODIUM 40 MILLIGRAM(S): 20 TABLET, DELAYED RELEASE ORAL at 06:25

## 2021-10-13 RX ADMIN — ENOXAPARIN SODIUM 40 MILLIGRAM(S): 100 INJECTION SUBCUTANEOUS at 06:25

## 2021-10-13 RX ADMIN — Medication 6 MILLIGRAM(S): at 06:25

## 2021-10-13 RX ADMIN — TAMSULOSIN HYDROCHLORIDE 0.4 MILLIGRAM(S): 0.4 CAPSULE ORAL at 22:41

## 2021-10-13 RX ADMIN — ZOLPIDEM TARTRATE 5 MILLIGRAM(S): 10 TABLET ORAL at 22:45

## 2021-10-13 RX ADMIN — TOCILIZUMAB 100 MILLIGRAM(S): 20 INJECTION, SOLUTION, CONCENTRATE INTRAVENOUS at 17:16

## 2021-10-13 NOTE — PHYSICAL THERAPY INITIAL EVALUATION ADULT - PERTINENT HX OF CURRENT PROBLEM, REHAB EVAL
77 y/o M w/ PMHx of HTN presenting to ED for Hypoxia; Covid 19 pneumonia; referred to PT for eval/tx.

## 2021-10-13 NOTE — PHYSICAL THERAPY INITIAL EVALUATION ADULT - GENERAL OBSERVATIONS, REHAB EVAL
220-240 pm Chart reviewed. Pt. seen out of bed in bedside recliner , in No apparent distress , + telemetry , supplemental O2 via HFNC (50 L/ 50% O2 ); Covid precautions observed, Pt. agreed to activity/therapy.

## 2021-10-14 LAB
ALBUMIN SERPL ELPH-MCNC: 3.7 G/DL — SIGNIFICANT CHANGE UP (ref 3.5–5.2)
ALP SERPL-CCNC: 47 U/L — SIGNIFICANT CHANGE UP (ref 30–115)
ALT FLD-CCNC: 46 U/L — HIGH (ref 0–41)
ANION GAP SERPL CALC-SCNC: 15 MMOL/L — HIGH (ref 7–14)
AST SERPL-CCNC: 62 U/L — HIGH (ref 0–41)
BASOPHILS # BLD AUTO: 0.01 K/UL — SIGNIFICANT CHANGE UP (ref 0–0.2)
BASOPHILS NFR BLD AUTO: 0.2 % — SIGNIFICANT CHANGE UP (ref 0–1)
BILIRUB SERPL-MCNC: 0.9 MG/DL — SIGNIFICANT CHANGE UP (ref 0.2–1.2)
BUN SERPL-MCNC: 28 MG/DL — HIGH (ref 10–20)
CALCIUM SERPL-MCNC: 8.6 MG/DL — SIGNIFICANT CHANGE UP (ref 8.5–10.1)
CHLORIDE SERPL-SCNC: 101 MMOL/L — SIGNIFICANT CHANGE UP (ref 98–110)
CO2 SERPL-SCNC: 22 MMOL/L — SIGNIFICANT CHANGE UP (ref 17–32)
CREAT SERPL-MCNC: 0.8 MG/DL — SIGNIFICANT CHANGE UP (ref 0.7–1.5)
EOSINOPHIL # BLD AUTO: 0.01 K/UL — SIGNIFICANT CHANGE UP (ref 0–0.7)
EOSINOPHIL NFR BLD AUTO: 0.2 % — SIGNIFICANT CHANGE UP (ref 0–8)
GLUCOSE SERPL-MCNC: 105 MG/DL — HIGH (ref 70–99)
HCT VFR BLD CALC: 46.4 % — SIGNIFICANT CHANGE UP (ref 42–52)
HGB BLD-MCNC: 15.7 G/DL — SIGNIFICANT CHANGE UP (ref 14–18)
IMM GRANULOCYTES NFR BLD AUTO: 1.2 % — HIGH (ref 0.1–0.3)
LYMPHOCYTES # BLD AUTO: 0.48 K/UL — LOW (ref 1.2–3.4)
LYMPHOCYTES # BLD AUTO: 11.2 % — LOW (ref 20.5–51.1)
MAGNESIUM SERPL-MCNC: 2.4 MG/DL — SIGNIFICANT CHANGE UP (ref 1.8–2.4)
MCHC RBC-ENTMCNC: 29.4 PG — SIGNIFICANT CHANGE UP (ref 27–31)
MCHC RBC-ENTMCNC: 33.8 G/DL — SIGNIFICANT CHANGE UP (ref 32–37)
MCV RBC AUTO: 86.9 FL — SIGNIFICANT CHANGE UP (ref 80–94)
MONOCYTES # BLD AUTO: 0.29 K/UL — SIGNIFICANT CHANGE UP (ref 0.1–0.6)
MONOCYTES NFR BLD AUTO: 6.8 % — SIGNIFICANT CHANGE UP (ref 1.7–9.3)
NEUTROPHILS # BLD AUTO: 3.43 K/UL — SIGNIFICANT CHANGE UP (ref 1.4–6.5)
NEUTROPHILS NFR BLD AUTO: 80.4 % — HIGH (ref 42.2–75.2)
NRBC # BLD: 0 /100 WBCS — SIGNIFICANT CHANGE UP (ref 0–0)
PLATELET # BLD AUTO: 195 K/UL — SIGNIFICANT CHANGE UP (ref 130–400)
POTASSIUM SERPL-MCNC: 4.2 MMOL/L — SIGNIFICANT CHANGE UP (ref 3.5–5)
POTASSIUM SERPL-SCNC: 4.2 MMOL/L — SIGNIFICANT CHANGE UP (ref 3.5–5)
PROT SERPL-MCNC: 6.1 G/DL — SIGNIFICANT CHANGE UP (ref 6–8)
RBC # BLD: 5.34 M/UL — SIGNIFICANT CHANGE UP (ref 4.7–6.1)
RBC # FLD: 13.2 % — SIGNIFICANT CHANGE UP (ref 11.5–14.5)
SODIUM SERPL-SCNC: 138 MMOL/L — SIGNIFICANT CHANGE UP (ref 135–146)
WBC # BLD: 4.27 K/UL — LOW (ref 4.8–10.8)
WBC # FLD AUTO: 4.27 K/UL — LOW (ref 4.8–10.8)

## 2021-10-14 PROCEDURE — 71045 X-RAY EXAM CHEST 1 VIEW: CPT | Mod: 26

## 2021-10-14 PROCEDURE — 99232 SBSQ HOSP IP/OBS MODERATE 35: CPT

## 2021-10-14 RX ORDER — ZOLPIDEM TARTRATE 10 MG/1
5 TABLET ORAL AT BEDTIME
Refills: 0 | Status: DISCONTINUED | OUTPATIENT
Start: 2021-10-14 | End: 2021-10-17

## 2021-10-14 RX ADMIN — PANTOPRAZOLE SODIUM 40 MILLIGRAM(S): 20 TABLET, DELAYED RELEASE ORAL at 08:32

## 2021-10-14 RX ADMIN — FINASTERIDE 5 MILLIGRAM(S): 5 TABLET, FILM COATED ORAL at 12:48

## 2021-10-14 RX ADMIN — TAMSULOSIN HYDROCHLORIDE 0.4 MILLIGRAM(S): 0.4 CAPSULE ORAL at 21:48

## 2021-10-14 RX ADMIN — FAMOTIDINE 40 MILLIGRAM(S): 10 INJECTION INTRAVENOUS at 17:50

## 2021-10-14 RX ADMIN — FAMOTIDINE 40 MILLIGRAM(S): 10 INJECTION INTRAVENOUS at 05:12

## 2021-10-14 RX ADMIN — ZOLPIDEM TARTRATE 5 MILLIGRAM(S): 10 TABLET ORAL at 22:44

## 2021-10-14 RX ADMIN — ENOXAPARIN SODIUM 40 MILLIGRAM(S): 100 INJECTION SUBCUTANEOUS at 05:11

## 2021-10-14 RX ADMIN — ESCITALOPRAM OXALATE 20 MILLIGRAM(S): 10 TABLET, FILM COATED ORAL at 12:48

## 2021-10-14 RX ADMIN — ENOXAPARIN SODIUM 40 MILLIGRAM(S): 100 INJECTION SUBCUTANEOUS at 17:50

## 2021-10-14 RX ADMIN — LISINOPRIL 20 MILLIGRAM(S): 2.5 TABLET ORAL at 05:12

## 2021-10-14 RX ADMIN — Medication 6 MILLIGRAM(S): at 05:13

## 2021-10-15 LAB
ALBUMIN SERPL ELPH-MCNC: 3.7 G/DL — SIGNIFICANT CHANGE UP (ref 3.5–5.2)
ALP SERPL-CCNC: 46 U/L — SIGNIFICANT CHANGE UP (ref 30–115)
ALT FLD-CCNC: 47 U/L — HIGH (ref 0–41)
ANION GAP SERPL CALC-SCNC: 13 MMOL/L — SIGNIFICANT CHANGE UP (ref 7–14)
AST SERPL-CCNC: 46 U/L — HIGH (ref 0–41)
BASOPHILS # BLD AUTO: 0 K/UL — SIGNIFICANT CHANGE UP (ref 0–0.2)
BASOPHILS NFR BLD AUTO: 0 % — SIGNIFICANT CHANGE UP (ref 0–1)
BILIRUB SERPL-MCNC: 0.9 MG/DL — SIGNIFICANT CHANGE UP (ref 0.2–1.2)
BUN SERPL-MCNC: 27 MG/DL — HIGH (ref 10–20)
CALCIUM SERPL-MCNC: 8.7 MG/DL — SIGNIFICANT CHANGE UP (ref 8.5–10.1)
CHLORIDE SERPL-SCNC: 103 MMOL/L — SIGNIFICANT CHANGE UP (ref 98–110)
CO2 SERPL-SCNC: 22 MMOL/L — SIGNIFICANT CHANGE UP (ref 17–32)
CREAT SERPL-MCNC: 0.9 MG/DL — SIGNIFICANT CHANGE UP (ref 0.7–1.5)
D DIMER BLD IA.RAPID-MCNC: 114 NG/ML DDU — SIGNIFICANT CHANGE UP (ref 0–230)
EOSINOPHIL # BLD AUTO: 0.03 K/UL — SIGNIFICANT CHANGE UP (ref 0–0.7)
EOSINOPHIL NFR BLD AUTO: 0.8 % — SIGNIFICANT CHANGE UP (ref 0–8)
FERRITIN SERPL-MCNC: 1041 NG/ML — HIGH (ref 30–400)
GLUCOSE SERPL-MCNC: 85 MG/DL — SIGNIFICANT CHANGE UP (ref 70–99)
HCT VFR BLD CALC: 46.9 % — SIGNIFICANT CHANGE UP (ref 42–52)
HGB BLD-MCNC: 15.7 G/DL — SIGNIFICANT CHANGE UP (ref 14–18)
IMM GRANULOCYTES NFR BLD AUTO: 2.8 % — HIGH (ref 0.1–0.3)
LYMPHOCYTES # BLD AUTO: 0.55 K/UL — LOW (ref 1.2–3.4)
LYMPHOCYTES # BLD AUTO: 15.5 % — LOW (ref 20.5–51.1)
MAGNESIUM SERPL-MCNC: 2.3 MG/DL — SIGNIFICANT CHANGE UP (ref 1.8–2.4)
MCHC RBC-ENTMCNC: 29.2 PG — SIGNIFICANT CHANGE UP (ref 27–31)
MCHC RBC-ENTMCNC: 33.5 G/DL — SIGNIFICANT CHANGE UP (ref 32–37)
MCV RBC AUTO: 87.3 FL — SIGNIFICANT CHANGE UP (ref 80–94)
MONOCYTES # BLD AUTO: 0.19 K/UL — SIGNIFICANT CHANGE UP (ref 0.1–0.6)
MONOCYTES NFR BLD AUTO: 5.4 % — SIGNIFICANT CHANGE UP (ref 1.7–9.3)
NEUTROPHILS # BLD AUTO: 2.68 K/UL — SIGNIFICANT CHANGE UP (ref 1.4–6.5)
NEUTROPHILS NFR BLD AUTO: 75.5 % — HIGH (ref 42.2–75.2)
NRBC # BLD: 0 /100 WBCS — SIGNIFICANT CHANGE UP (ref 0–0)
PLATELET # BLD AUTO: 197 K/UL — SIGNIFICANT CHANGE UP (ref 130–400)
POTASSIUM SERPL-MCNC: 4.2 MMOL/L — SIGNIFICANT CHANGE UP (ref 3.5–5)
POTASSIUM SERPL-SCNC: 4.2 MMOL/L — SIGNIFICANT CHANGE UP (ref 3.5–5)
PROT SERPL-MCNC: 6.2 G/DL — SIGNIFICANT CHANGE UP (ref 6–8)
RBC # BLD: 5.37 M/UL — SIGNIFICANT CHANGE UP (ref 4.7–6.1)
RBC # FLD: 13.1 % — SIGNIFICANT CHANGE UP (ref 11.5–14.5)
SODIUM SERPL-SCNC: 138 MMOL/L — SIGNIFICANT CHANGE UP (ref 135–146)
WBC # BLD: 3.55 K/UL — LOW (ref 4.8–10.8)
WBC # FLD AUTO: 3.55 K/UL — LOW (ref 4.8–10.8)

## 2021-10-15 PROCEDURE — 71045 X-RAY EXAM CHEST 1 VIEW: CPT | Mod: 26

## 2021-10-15 PROCEDURE — 99232 SBSQ HOSP IP/OBS MODERATE 35: CPT

## 2021-10-15 RX ORDER — LIDOCAINE 4 G/100G
1 CREAM TOPICAL ONCE
Refills: 0 | Status: COMPLETED | OUTPATIENT
Start: 2021-10-15 | End: 2021-10-15

## 2021-10-15 RX ADMIN — Medication 650 MILLIGRAM(S): at 07:05

## 2021-10-15 RX ADMIN — ENOXAPARIN SODIUM 40 MILLIGRAM(S): 100 INJECTION SUBCUTANEOUS at 05:27

## 2021-10-15 RX ADMIN — ESCITALOPRAM OXALATE 20 MILLIGRAM(S): 10 TABLET, FILM COATED ORAL at 14:23

## 2021-10-15 RX ADMIN — ZOLPIDEM TARTRATE 5 MILLIGRAM(S): 10 TABLET ORAL at 23:22

## 2021-10-15 RX ADMIN — TAMSULOSIN HYDROCHLORIDE 0.4 MILLIGRAM(S): 0.4 CAPSULE ORAL at 21:58

## 2021-10-15 RX ADMIN — AMLODIPINE BESYLATE 5 MILLIGRAM(S): 2.5 TABLET ORAL at 05:27

## 2021-10-15 RX ADMIN — PANTOPRAZOLE SODIUM 40 MILLIGRAM(S): 20 TABLET, DELAYED RELEASE ORAL at 06:20

## 2021-10-15 RX ADMIN — ENOXAPARIN SODIUM 40 MILLIGRAM(S): 100 INJECTION SUBCUTANEOUS at 17:01

## 2021-10-15 RX ADMIN — FINASTERIDE 5 MILLIGRAM(S): 5 TABLET, FILM COATED ORAL at 14:23

## 2021-10-15 RX ADMIN — FAMOTIDINE 40 MILLIGRAM(S): 10 INJECTION INTRAVENOUS at 17:00

## 2021-10-15 RX ADMIN — Medication 6 MILLIGRAM(S): at 05:26

## 2021-10-15 RX ADMIN — FAMOTIDINE 40 MILLIGRAM(S): 10 INJECTION INTRAVENOUS at 05:26

## 2021-10-15 RX ADMIN — LISINOPRIL 20 MILLIGRAM(S): 2.5 TABLET ORAL at 05:26

## 2021-10-15 NOTE — ED ADULT NURSE REASSESSMENT NOTE - NS ED NURSE REASSESS COMMENT FT1
patient seen and assessed. pt is a/o x4. denies any discomfort or complaints at this time. pt tolerating high flow. saturation 95%. pt void noted output 140. vss.

## 2021-10-16 ENCOUNTER — TRANSCRIPTION ENCOUNTER (OUTPATIENT)
Age: 76
End: 2021-10-16

## 2021-10-16 LAB
ALBUMIN SERPL ELPH-MCNC: 3.6 G/DL — SIGNIFICANT CHANGE UP (ref 3.5–5.2)
ALP SERPL-CCNC: 44 U/L — SIGNIFICANT CHANGE UP (ref 30–115)
ALT FLD-CCNC: 59 U/L — HIGH (ref 0–41)
ANION GAP SERPL CALC-SCNC: 14 MMOL/L — SIGNIFICANT CHANGE UP (ref 7–14)
AST SERPL-CCNC: 53 U/L — HIGH (ref 0–41)
BASOPHILS # BLD AUTO: 0.01 K/UL — SIGNIFICANT CHANGE UP (ref 0–0.2)
BASOPHILS NFR BLD AUTO: 0.3 % — SIGNIFICANT CHANGE UP (ref 0–1)
BILIRUB SERPL-MCNC: 0.9 MG/DL — SIGNIFICANT CHANGE UP (ref 0.2–1.2)
BUN SERPL-MCNC: 26 MG/DL — HIGH (ref 10–20)
CALCIUM SERPL-MCNC: 8.5 MG/DL — SIGNIFICANT CHANGE UP (ref 8.5–10.1)
CHLORIDE SERPL-SCNC: 103 MMOL/L — SIGNIFICANT CHANGE UP (ref 98–110)
CO2 SERPL-SCNC: 21 MMOL/L — SIGNIFICANT CHANGE UP (ref 17–32)
CREAT SERPL-MCNC: 0.9 MG/DL — SIGNIFICANT CHANGE UP (ref 0.7–1.5)
CRP SERPL-MCNC: 25 MG/L — HIGH
EOSINOPHIL # BLD AUTO: 0.12 K/UL — SIGNIFICANT CHANGE UP (ref 0–0.7)
EOSINOPHIL NFR BLD AUTO: 3.3 % — SIGNIFICANT CHANGE UP (ref 0–8)
GLUCOSE SERPL-MCNC: 79 MG/DL — SIGNIFICANT CHANGE UP (ref 70–99)
HCT VFR BLD CALC: 48.9 % — SIGNIFICANT CHANGE UP (ref 42–52)
HGB BLD-MCNC: 16.1 G/DL — SIGNIFICANT CHANGE UP (ref 14–18)
IMM GRANULOCYTES NFR BLD AUTO: 5.5 % — HIGH (ref 0.1–0.3)
LYMPHOCYTES # BLD AUTO: 0.58 K/UL — LOW (ref 1.2–3.4)
LYMPHOCYTES # BLD AUTO: 15.9 % — LOW (ref 20.5–51.1)
MAGNESIUM SERPL-MCNC: 2.3 MG/DL — SIGNIFICANT CHANGE UP (ref 1.8–2.4)
MCHC RBC-ENTMCNC: 29 PG — SIGNIFICANT CHANGE UP (ref 27–31)
MCHC RBC-ENTMCNC: 32.9 G/DL — SIGNIFICANT CHANGE UP (ref 32–37)
MCV RBC AUTO: 87.9 FL — SIGNIFICANT CHANGE UP (ref 80–94)
MONOCYTES # BLD AUTO: 0.23 K/UL — SIGNIFICANT CHANGE UP (ref 0.1–0.6)
MONOCYTES NFR BLD AUTO: 6.3 % — SIGNIFICANT CHANGE UP (ref 1.7–9.3)
NEUTROPHILS # BLD AUTO: 2.5 K/UL — SIGNIFICANT CHANGE UP (ref 1.4–6.5)
NEUTROPHILS NFR BLD AUTO: 68.7 % — SIGNIFICANT CHANGE UP (ref 42.2–75.2)
NRBC # BLD: 0 /100 WBCS — SIGNIFICANT CHANGE UP (ref 0–0)
PLATELET # BLD AUTO: 229 K/UL — SIGNIFICANT CHANGE UP (ref 130–400)
POTASSIUM SERPL-MCNC: 4.6 MMOL/L — SIGNIFICANT CHANGE UP (ref 3.5–5)
POTASSIUM SERPL-SCNC: 4.6 MMOL/L — SIGNIFICANT CHANGE UP (ref 3.5–5)
PROCALCITONIN SERPL-MCNC: 0.05 NG/ML — SIGNIFICANT CHANGE UP (ref 0.02–0.1)
PROT SERPL-MCNC: 5.9 G/DL — LOW (ref 6–8)
RBC # BLD: 5.56 M/UL — SIGNIFICANT CHANGE UP (ref 4.7–6.1)
RBC # FLD: 12.9 % — SIGNIFICANT CHANGE UP (ref 11.5–14.5)
SODIUM SERPL-SCNC: 138 MMOL/L — SIGNIFICANT CHANGE UP (ref 135–146)
WBC # BLD: 3.64 K/UL — LOW (ref 4.8–10.8)
WBC # FLD AUTO: 3.64 K/UL — LOW (ref 4.8–10.8)

## 2021-10-16 PROCEDURE — 99232 SBSQ HOSP IP/OBS MODERATE 35: CPT

## 2021-10-16 PROCEDURE — 71045 X-RAY EXAM CHEST 1 VIEW: CPT | Mod: 26

## 2021-10-16 RX ADMIN — ESCITALOPRAM OXALATE 20 MILLIGRAM(S): 10 TABLET, FILM COATED ORAL at 10:28

## 2021-10-16 RX ADMIN — ZOLPIDEM TARTRATE 5 MILLIGRAM(S): 10 TABLET ORAL at 22:16

## 2021-10-16 RX ADMIN — TAMSULOSIN HYDROCHLORIDE 0.4 MILLIGRAM(S): 0.4 CAPSULE ORAL at 22:15

## 2021-10-16 RX ADMIN — ENOXAPARIN SODIUM 40 MILLIGRAM(S): 100 INJECTION SUBCUTANEOUS at 05:44

## 2021-10-16 RX ADMIN — FAMOTIDINE 40 MILLIGRAM(S): 10 INJECTION INTRAVENOUS at 05:42

## 2021-10-16 RX ADMIN — Medication 6 MILLIGRAM(S): at 05:43

## 2021-10-16 RX ADMIN — LISINOPRIL 20 MILLIGRAM(S): 2.5 TABLET ORAL at 05:42

## 2021-10-16 RX ADMIN — AMLODIPINE BESYLATE 5 MILLIGRAM(S): 2.5 TABLET ORAL at 05:43

## 2021-10-16 RX ADMIN — ENOXAPARIN SODIUM 40 MILLIGRAM(S): 100 INJECTION SUBCUTANEOUS at 17:38

## 2021-10-16 RX ADMIN — PANTOPRAZOLE SODIUM 40 MILLIGRAM(S): 20 TABLET, DELAYED RELEASE ORAL at 06:30

## 2021-10-16 RX ADMIN — FINASTERIDE 5 MILLIGRAM(S): 5 TABLET, FILM COATED ORAL at 10:28

## 2021-10-16 RX ADMIN — FAMOTIDINE 40 MILLIGRAM(S): 10 INJECTION INTRAVENOUS at 17:38

## 2021-10-16 NOTE — DISCHARGE NOTE PROVIDER - CARE PROVIDER_API CALL
Emil Ventura)  [address] [address]  [address] [facility], [address]  [place] [address]  Phone: [phone]  Fax: [phone]  Follow Up Time: 1 week

## 2021-10-16 NOTE — PROGRESS NOTE ADULT - SUBJECTIVE AND OBJECTIVE BOX
MELLISA, HIWOT  76y, Male    All available historical data reviewed    OVERNIGHT EVENTS:  no fevers  HFNC  feels well and has no new complaints     ROS:  General: Denies rigors, nightsweats  HEENT: Denies headache, rhinorrhea, sore throat, eye pain  CV: Denies CP, palpitations  PULM: Denies wheezing, hemoptysis  GI: Denies hematemesis, hematochezia, melena  : Denies discharge, hematuria  MSK: Denies arthralgias, myalgias  SKIN: Denies rash, lesions  NEURO: Denies paresthesias, weakness  PSYCH: Denies depression, anxiety    VITALS:  T(F): 97.7, Max: 97.7 (10-13-21 @ 05:15)  HR: 71  BP: 143/76  RR: 16Vital Signs Last 24 Hrs  T(C): 36.5 (13 Oct 2021 05:15), Max: 36.5 (13 Oct 2021 05:15)  T(F): 97.7 (13 Oct 2021 05:15), Max: 97.7 (13 Oct 2021 05:15)  HR: 71 (13 Oct 2021 05:15) (67 - 74)  BP: 143/76 (13 Oct 2021 05:15) (128/69 - 143/76)  BP(mean): 102 (13 Oct 2021 05:15) (102 - 102)  RR: 16 (13 Oct 2021 05:15) (16 - 18)  SpO2: 94% (13 Oct 2021 05:15) (94% - 96%)    TESTS & MEASUREMENTS:                        15.2   3.90  )-----------( 158      ( 13 Oct 2021 04:30 )             46.0     10-13    139  |  102  |  30<H>  ----------------------------<  124<H>  4.2   |  21  |  0.9    Ca    8.4<L>      13 Oct 2021 04:30  Mg     2.7     10-12    TPro  6.1  /  Alb  3.6  /  TBili  0.6  /  DBili  x   /  AST  40  /  ALT  30  /  AlkPhos  44  10-13    LIVER FUNCTIONS - ( 13 Oct 2021 04:30 )  Alb: 3.6 g/dL / Pro: 6.1 g/dL / ALK PHOS: 44 U/L / ALT: 30 U/L / AST: 40 U/L / GGT: x                   RADIOLOGY & ADDITIONAL TESTS:  Personal review of radiological diagnostics performed  Echo and EKG results noted when applicable.     MEDICATIONS:  dexAMETHasone     Tablet 6 milliGRAM(s) Oral daily  enoxaparin Injectable 40 milliGRAM(s) SubCutaneous two times a day  pantoprazole    Tablet 40 milliGRAM(s) Oral before breakfast  zolpidem 5 milliGRAM(s) Oral at bedtime PRN      ANTIBIOTICS:    
Pt seen and examined at bedside. No CP. reports SOB. Pt reports feeling better.     ROS: neg except as noted above    PAST MEDICAL & SURGICAL HISTORY:  HTN (hypertension)    Kidney stone    Grant esophagus    Back pain  herniated disc    H/O arthroscopy of knee    S/P carpal tunnel release        VITAL SIGNS (Last 24 hrs):  T(C): 36.4 (10-14-21 @ 16:04), Max: 36.7 (10-13-21 @ 21:24)  HR: 76 (10-14-21 @ 16:04) (67 - 79)  BP: 146/74 (10-14-21 @ 16:04) (129/64 - 166/81)  RR: 18 (10-14-21 @ 16:04) (16 - 20)  SpO2: 97% (10-14-21 @ 16:04) (92% - 97%)  Wt(kg): --  Daily     Daily     I&O's Summary    13 Oct 2021 07:01  -  14 Oct 2021 07:00  --------------------------------------------------------  IN: 0 mL / OUT: 500 mL / NET: -500 mL        PHYSICAL EXAM:  GENERAL: NAD, well-developed  HEAD:  Atraumatic, Normocephalic  EYES: EOMI, PERRLA, conjunctiva and sclera clear  NECK: Supple, No JVD  CHEST/LUNG: Clear to auscultation bilaterally; No wheeze  HEART: Regular rate and rhythm; No murmurs, rubs, or gallops  ABDOMEN: Soft, Nontender, Nondistended; Bowel sounds present  EXTREMITIES:  2+ Peripheral Pulses, No clubbing, cyanosis, or edema  PSYCH: AAOx3  NEUROLOGY: non-focal  SKIN: No rashes or lesions    Labs Reviewed  Spoke to patient in regards to abnormal labs.    CBC Full  -  ( 14 Oct 2021 06:09 )  WBC Count : 4.27 K/uL  Hemoglobin : 15.7 g/dL  Hematocrit : 46.4 %  Platelet Count - Automated : 195 K/uL  Mean Cell Volume : 86.9 fL  Mean Cell Hemoglobin : 29.4 pg  Mean Cell Hemoglobin Concentration : 33.8 g/dL  Auto Neutrophil # : 3.43 K/uL  Auto Lymphocyte # : 0.48 K/uL  Auto Monocyte # : 0.29 K/uL  Auto Eosinophil # : 0.01 K/uL  Auto Basophil # : 0.01 K/uL  Auto Neutrophil % : 80.4 %  Auto Lymphocyte % : 11.2 %  Auto Monocyte % : 6.8 %  Auto Eosinophil % : 0.2 %  Auto Basophil % : 0.2 %    BMP:    10-14 @ 06:09    Blood Urea Nitrogen - 28  Calcium - 8.6  Carbond Dioxide - 22  Chloride - 101  Creatinine - 0.8  Glucose - 105  Potassium - 4.2  Sodium - 138      Hemoglobin A1c -     Urine Culture:        COVID Labs  C-Reactive Protein, Serum: 273 mg/L (10-11-21 @ 20:32)    Procalcitonin, Serum: 0.56 ng/mL (10-11-21 @ 20:32)    D-Dimer:  135 ng/mL DDU (10-11-21 @ 20:32)    Ferritin, Serum: 1545 ng/mL (10-11-21 @ 20:32)      Imaging reviewed      MEDICATIONS  (STANDING):  amLODIPine   Tablet 5 milliGRAM(s) Oral daily  dexAMETHasone     Tablet 6 milliGRAM(s) Oral daily  enoxaparin Injectable 40 milliGRAM(s) SubCutaneous two times a day  escitalopram 20 milliGRAM(s) Oral daily  famotidine    Tablet 40 milliGRAM(s) Oral two times a day  finasteride 5 milliGRAM(s) Oral daily  lisinopril 20 milliGRAM(s) Oral daily  pantoprazole    Tablet 40 milliGRAM(s) Oral before breakfast  tamsulosin 0.4 milliGRAM(s) Oral at bedtime    MEDICATIONS  (PRN):      
Pt seen and examined at bedside. No CP or SOB. Pt reports feeling better.     ROS: neg except as noted above    PAST MEDICAL & SURGICAL HISTORY:  HTN (hypertension)    Kidney stone    Grant esophagus    Back pain  herniated disc    H/O arthroscopy of knee    S/P carpal tunnel release        VITAL SIGNS (Last 24 hrs):  T(C): 36.7 (10-16-21 @ 09:00), Max: 37.1 (10-16-21 @ 04:00)  HR: 81 (10-16-21 @ 09:00) (72 - 81)  BP: 124/70 (10-16-21 @ 09:00) (95/43 - 137/73)  RR: 22 (10-16-21 @ 09:00) (20 - 24)  SpO2: 95% (10-16-21 @ 09:00) (92% - 97%)  Wt(kg): --  Daily     Daily     I&O's Summary    15 Oct 2021 07:01  -  16 Oct 2021 07:00  --------------------------------------------------------  IN: 0 mL / OUT: 250 mL / NET: -250 mL        PHYSICAL EXAM:  GENERAL: NAD, well-developed  HEAD:  Atraumatic, Normocephalic  EYES: EOMI, PERRLA, conjunctiva and sclera clear  NECK: Supple, No JVD  CHEST/LUNG: Clear to auscultation bilaterally; No wheeze  HEART: Regular rate and rhythm; No murmurs, rubs, or gallops  ABDOMEN: Soft, Nontender, Nondistended; Bowel sounds present  EXTREMITIES:  2+ Peripheral Pulses, No clubbing, cyanosis, or edema  PSYCH: AAOx3  NEUROLOGY: non-focal  SKIN: No rashes or lesions    Labs Reviewed  Spoke to patient in regards to abnormal labs.    CBC Full  -  ( 16 Oct 2021 04:30 )  WBC Count : 3.64 K/uL  Hemoglobin : 16.1 g/dL  Hematocrit : 48.9 %  Platelet Count - Automated : 229 K/uL  Mean Cell Volume : 87.9 fL  Mean Cell Hemoglobin : 29.0 pg  Mean Cell Hemoglobin Concentration : 32.9 g/dL  Auto Neutrophil # : 2.50 K/uL  Auto Lymphocyte # : 0.58 K/uL  Auto Monocyte # : 0.23 K/uL  Auto Eosinophil # : 0.12 K/uL  Auto Basophil # : 0.01 K/uL  Auto Neutrophil % : 68.7 %  Auto Lymphocyte % : 15.9 %  Auto Monocyte % : 6.3 %  Auto Eosinophil % : 3.3 %  Auto Basophil % : 0.3 %    BMP:    10-16 @ 04:30    Blood Urea Nitrogen - 26  Calcium - 8.5  Carbond Dioxide - 21  Chloride - 103  Creatinine - 0.9  Glucose - 79  Potassium - 4.6  Sodium - 138      Hemoglobin A1c -     Urine Culture:        COVID Labs  C-Reactive Protein, Serum: 25 mg/L (10-15-21 @ 06:35)  C-Reactive Protein, Serum: 273 mg/L (10-11-21 @ 20:32)    Procalcitonin, Serum: 0.05 ng/mL (10-15-21 @ 06:35)  Procalcitonin, Serum: 0.56 ng/mL (10-11-21 @ 20:32)    D-Dimer:  114 ng/mL DDU (10-15-21 @ 06:35)  135 ng/mL DDU (10-11-21 @ 20:32)    Ferritin, Serum: 1041 ng/mL (10-15-21 @ 08:00)  Ferritin, Serum: 1545 ng/mL (10-11-21 @ 20:32)      Imaging reviewed independently   bilateral opacities unchanged      MEDICATIONS  (STANDING):  amLODIPine   Tablet 5 milliGRAM(s) Oral daily  dexAMETHasone     Tablet 6 milliGRAM(s) Oral daily  enoxaparin Injectable 40 milliGRAM(s) SubCutaneous two times a day  escitalopram 20 milliGRAM(s) Oral daily  famotidine    Tablet 40 milliGRAM(s) Oral two times a day  finasteride 5 milliGRAM(s) Oral daily  lisinopril 20 milliGRAM(s) Oral daily  pantoprazole    Tablet 40 milliGRAM(s) Oral before breakfast  tamsulosin 0.4 milliGRAM(s) Oral at bedtime    MEDICATIONS  (PRN):  zolpidem 5 milliGRAM(s) Oral at bedtime PRN Insomnia              
Pt seen and examined at bedside. No CP. reports SOB. Pt reports feeling better.     ROS: neg except as noted above    PAST MEDICAL & SURGICAL HISTORY:  HTN (hypertension)    Kidney stone    Grant esophagus    Back pain  herniated disc    H/O arthroscopy of knee    S/P carpal tunnel release        VITAL SIGNS (Last 24 hrs):  T(C): 36.6 (10-13-21 @ 12:46), Max: 36.6 (10-13-21 @ 12:46)  HR: 61 (10-13-21 @ 12:46) (61 - 74)  BP: 144/74 (10-13-21 @ 12:46) (128/69 - 155/77)  RR: 16 (10-13-21 @ 08:00) (16 - 18)  SpO2: 92% (10-13-21 @ 12:46) (92% - 96%)  Wt(kg): --  Daily     Daily     I&O's Summary    12 Oct 2021 07:01  -  13 Oct 2021 07:00  --------------------------------------------------------  IN: 0 mL / OUT: 600 mL / NET: -600 mL        PHYSICAL EXAM:  GENERAL: NAD, well-developed  HEAD:  Atraumatic, Normocephalic  EYES: EOMI, PERRLA, conjunctiva and sclera clear  NECK: Supple, No JVD  CHEST/LUNG: Clear to auscultation bilaterally; No wheeze  HEART: Regular rate and rhythm; No murmurs, rubs, or gallops  ABDOMEN: Soft, Nontender, Nondistended; Bowel sounds present  EXTREMITIES:  2+ Peripheral Pulses, No clubbing, cyanosis, or edema  PSYCH: AAOx3  NEUROLOGY: non-focal  SKIN: No rashes or lesions    Labs Reviewed  Spoke to patient in regards to abnormal labs.    CBC Full  -  ( 13 Oct 2021 04:30 )  WBC Count : 3.90 K/uL  Hemoglobin : 15.2 g/dL  Hematocrit : 46.0 %  Platelet Count - Automated : 158 K/uL  Mean Cell Volume : 87.8 fL  Mean Cell Hemoglobin : 29.0 pg  Mean Cell Hemoglobin Concentration : 33.0 g/dL  Auto Neutrophil # : 3.19 K/uL  Auto Lymphocyte # : 0.38 K/uL  Auto Monocyte # : 0.28 K/uL  Auto Eosinophil # : 0.00 K/uL  Auto Basophil # : 0.01 K/uL  Auto Neutrophil % : 81.8 %  Auto Lymphocyte % : 9.7 %  Auto Monocyte % : 7.2 %  Auto Eosinophil % : 0.0 %  Auto Basophil % : 0.3 %    BMP:    10-13 @ 04:30    Blood Urea Nitrogen - 30  Calcium - 8.4  Carbond Dioxide - 21  Chloride - 102  Creatinine - 0.9  Glucose - 124  Potassium - 4.2  Sodium - 139      Hemoglobin A1c -     Urine Culture:        COVID Labs  C-Reactive Protein, Serum: 273 mg/L (10-11-21 @ 20:32)    Procalcitonin, Serum: 0.56 ng/mL (10-11-21 @ 20:32)    D-Dimer:  135 ng/mL DDU (10-11-21 @ 20:32)    Ferritin, Serum: 1545 ng/mL (10-11-21 @ 20:32)      Imaging reviewed    < from: Xray Chest 1 View- PORTABLE-Routine (Xray Chest 1 View- PORTABLE-Routine in AM.) (10.13.21 @ 06:32) >  Impression:    Unchanged patchy bilateral opacities.EXAM:  XR CHEST PORTABLE ROUTINE 1V            PROCEDURE DATE:  10/13/2021      < end of copied text >        MEDICATIONS  (STANDING):  dexAMETHasone     Tablet 6 milliGRAM(s) Oral daily  enoxaparin Injectable 40 milliGRAM(s) SubCutaneous two times a day  pantoprazole    Tablet 40 milliGRAM(s) Oral before breakfast  tocilizumab IVPB 800 milliGRAM(s) IV Intermittent once    MEDICATIONS  (PRN):  zolpidem 5 milliGRAM(s) Oral at bedtime PRN Insomnia      
Pt seen and examined at bedside. No CP. reports SOB. Pt reports feeling better.     ROS: neg except as noted above    PAST MEDICAL & SURGICAL HISTORY:  HTN (hypertension)    Kidney stone    Grant esophagus    Back pain  herniated disc    H/O arthroscopy of knee    S/P carpal tunnel release        VITAL SIGNS (Last 24 hrs):  T(C): 36.7 (10-15-21 @ 15:37), Max: 36.7 (10-15-21 @ 15:37)  HR: 77 (10-15-21 @ 15:37) (63 - 77)  BP: 130/69 (10-15-21 @ 15:37) (122/70 - 151/85)  RR: 18 (10-15-21 @ 15:37) (18 - 20)  SpO2: 95% (10-15-21 @ 15:37) (91% - 96%)  Wt(kg): --  Daily     Daily     I&O's Summary      PHYSICAL EXAM:  GENERAL: NAD, well-developed  HEAD:  Atraumatic, Normocephalic  EYES: EOMI, PERRLA, conjunctiva and sclera clear  NECK: Supple, No JVD  CHEST/LUNG: Clear to auscultation bilaterally; No wheeze  HEART: Regular rate and rhythm; No murmurs, rubs, or gallops  ABDOMEN: Soft, Nontender, Nondistended; Bowel sounds present  EXTREMITIES:  2+ Peripheral Pulses, No clubbing, cyanosis, or edema  PSYCH: AAOx3  NEUROLOGY: non-focal  SKIN: No rashes or lesions    Labs Reviewed  Spoke to patient in regards to abnormal labs.    CBC Full  -  ( 15 Oct 2021 06:35 )  WBC Count : 3.55 K/uL  Hemoglobin : 15.7 g/dL  Hematocrit : 46.9 %  Platelet Count - Automated : 197 K/uL  Mean Cell Volume : 87.3 fL  Mean Cell Hemoglobin : 29.2 pg  Mean Cell Hemoglobin Concentration : 33.5 g/dL  Auto Neutrophil # : 2.68 K/uL  Auto Lymphocyte # : 0.55 K/uL  Auto Monocyte # : 0.19 K/uL  Auto Eosinophil # : 0.03 K/uL  Auto Basophil # : 0.00 K/uL  Auto Neutrophil % : 75.5 %  Auto Lymphocyte % : 15.5 %  Auto Monocyte % : 5.4 %  Auto Eosinophil % : 0.8 %  Auto Basophil % : 0.0 %    BMP:    10-15 @ 06:35    Blood Urea Nitrogen - 27  Calcium - 8.7  Carbond Dioxide - 22  Chloride - 103  Creatinine - 0.9  Glucose - 85  Potassium - 4.2  Sodium - 138      Hemoglobin A1c -     Urine Culture:        COVID Labs  C-Reactive Protein, Serum: 273 mg/L (10-11-21 @ 20:32)    Procalcitonin, Serum: 0.56 ng/mL (10-11-21 @ 20:32)    D-Dimer:  114 ng/mL DDU (10-15-21 @ 06:35)  135 ng/mL DDU (10-11-21 @ 20:32)    Ferritin, Serum: 1545 ng/mL (10-11-21 @ 20:32)      Imaging reviewed    < from: Xray Chest 1 View- PORTABLE-Routine (Xray Chest 1 View- PORTABLE-Routine in AM.) (10.15.21 @ 06:06) >  Impression:  Stable bilateral opacities. No definite pneumothorax.  EXAM:  XR CHEST PORTABLE ROUTINE 1V            PROCEDURE DATE:  10/15/2021    < end of copied text >        MEDICATIONS  (STANDING):  amLODIPine   Tablet 5 milliGRAM(s) Oral daily  dexAMETHasone     Tablet 6 milliGRAM(s) Oral daily  enoxaparin Injectable 40 milliGRAM(s) SubCutaneous two times a day  escitalopram 20 milliGRAM(s) Oral daily  famotidine    Tablet 40 milliGRAM(s) Oral two times a day  finasteride 5 milliGRAM(s) Oral daily  lisinopril 20 milliGRAM(s) Oral daily  pantoprazole    Tablet 40 milliGRAM(s) Oral before breakfast  tamsulosin 0.4 milliGRAM(s) Oral at bedtime    MEDICATIONS  (PRN):  zolpidem 5 milliGRAM(s) Oral at bedtime PRN Insomnia          
  MELLISA, HIWOT  76y, Male    All available historical data reviewed    OVERNIGHT EVENTS:  no fevers  feels well and has no new complaints  HFNC    ROS:  General: Denies rigors, nightsweats  HEENT: Denies headache, rhinorrhea, sore throat, eye pain  CV: Denies CP, palpitations  PULM: Denies wheezing, hemoptysis  GI: Denies hematemesis, hematochezia, melena  : Denies discharge, hematuria  MSK: Denies arthralgias, myalgias  SKIN: Denies rash, lesions  NEURO: Denies paresthesias, weakness  PSYCH: Denies depression, anxiety    VITALS:  T(F): 96, Max: 97.6 (10-14-21 @ 16:04)  HR: 67  BP: 122/70  RR: 20Vital Signs Last 24 Hrs  T(C): 35.6 (15 Oct 2021 08:15), Max: 36.4 (14 Oct 2021 16:04)  T(F): 96 (15 Oct 2021 08:15), Max: 97.6 (14 Oct 2021 16:04)  HR: 67 (15 Oct 2021 08:15) (63 - 79)  BP: 122/70 (15 Oct 2021 08:15) (122/70 - 151/85)  BP(mean): 89 (14 Oct 2021 12:00) (89 - 89)  RR: 20 (15 Oct 2021 08:15) (18 - 20)  SpO2: 95% (15 Oct 2021 08:15) (93% - 97%)    TESTS & MEASUREMENTS:                        15.7   3.55  )-----------( 197      ( 15 Oct 2021 06:35 )             46.9     10-15    138  |  103  |  27<H>  ----------------------------<  85  4.2   |  22  |  0.9    Ca    8.7      15 Oct 2021 06:35  Mg     2.3     10-15    TPro  6.2  /  Alb  3.7  /  TBili  0.9  /  DBili  x   /  AST  46<H>  /  ALT  47<H>  /  AlkPhos  46  10-15    LIVER FUNCTIONS - ( 15 Oct 2021 06:35 )  Alb: 3.7 g/dL / Pro: 6.2 g/dL / ALK PHOS: 46 U/L / ALT: 47 U/L / AST: 46 U/L / GGT: x                   RADIOLOGY & ADDITIONAL TESTS:  Personal review of radiological diagnostics performed  Echo and EKG results noted when applicable.     MEDICATIONS:  amLODIPine   Tablet 5 milliGRAM(s) Oral daily  dexAMETHasone     Tablet 6 milliGRAM(s) Oral daily  enoxaparin Injectable 40 milliGRAM(s) SubCutaneous two times a day  escitalopram 20 milliGRAM(s) Oral daily  famotidine    Tablet 40 milliGRAM(s) Oral two times a day  finasteride 5 milliGRAM(s) Oral daily  lisinopril 20 milliGRAM(s) Oral daily  pantoprazole    Tablet 40 milliGRAM(s) Oral before breakfast  tamsulosin 0.4 milliGRAM(s) Oral at bedtime  zolpidem 5 milliGRAM(s) Oral at bedtime PRN      ANTIBIOTICS:    
  MELLISA, HIWOT  76y, Male    All available historical data reviewed    OVERNIGHT EVENTS:  no fevers  HFNC  feels well and has no new complaints     ROS:  General: Denies rigors, nightsweats  HEENT: Denies headache, rhinorrhea, sore throat, eye pain  CV: Denies CP, palpitations  PULM: Denies wheezing, hemoptysis  GI: Denies hematemesis, hematochezia, melena  : Denies discharge, hematuria  MSK: Denies arthralgias, myalgias  SKIN: Denies rash, lesions  NEURO: Denies paresthesias, weakness  PSYCH: Denies depression, anxiety    VITALS:  T(F): 97, Max: 98 (10-13-21 @ 21:24)  HR: 68  BP: 139/76  RR: 18Vital Signs Last 24 Hrs  T(C): 36.1 (14 Oct 2021 08:00), Max: 36.7 (13 Oct 2021 21:24)  T(F): 97 (14 Oct 2021 08:00), Max: 98 (13 Oct 2021 21:24)  HR: 68 (14 Oct 2021 08:00) (61 - 75)  BP: 139/76 (14 Oct 2021 08:00) (139/76 - 166/81)  BP(mean): 101 (14 Oct 2021 08:00) (101 - 115)  RR: 18 (14 Oct 2021 08:00) (16 - 18)  SpO2: 94% (14 Oct 2021 08:00) (92% - 97%)    TESTS & MEASUREMENTS:                        15.7   4.27  )-----------( 195      ( 14 Oct 2021 06:09 )             46.4     10-14    138  |  101  |  28<H>  ----------------------------<  105<H>  4.2   |  22  |  0.8    Ca    8.6      14 Oct 2021 06:09  Mg     2.4     10-14    TPro  6.1  /  Alb  3.7  /  TBili  0.9  /  DBili  x   /  AST  62<H>  /  ALT  46<H>  /  AlkPhos  47  10-14    LIVER FUNCTIONS - ( 14 Oct 2021 06:09 )  Alb: 3.7 g/dL / Pro: 6.1 g/dL / ALK PHOS: 47 U/L / ALT: 46 U/L / AST: 62 U/L / GGT: x                   RADIOLOGY & ADDITIONAL TESTS:  Personal review of radiological diagnostics performed  Echo and EKG results noted when applicable.     MEDICATIONS:  amLODIPine   Tablet 5 milliGRAM(s) Oral daily  dexAMETHasone     Tablet 6 milliGRAM(s) Oral daily  enoxaparin Injectable 40 milliGRAM(s) SubCutaneous two times a day  escitalopram 20 milliGRAM(s) Oral daily  famotidine    Tablet 40 milliGRAM(s) Oral two times a day  finasteride 5 milliGRAM(s) Oral daily  lisinopril 20 milliGRAM(s) Oral daily  pantoprazole    Tablet 40 milliGRAM(s) Oral before breakfast  tamsulosin 0.4 milliGRAM(s) Oral at bedtime      ANTIBIOTICS:

## 2021-10-16 NOTE — DISCHARGE NOTE PROVIDER - NSDCMRMEDTOKEN_GEN_ALL_CORE_FT
amlodipine-benazepril 5 mg-20 mg oral capsule: 1 cap(s) orally once a day  escitalopram 20 mg oral tablet: 1 tab(s) orally once a day  famotidine 40 mg oral tablet: 1 tab(s) orally once a day  finasteride 5 mg oral tablet: 1 tab(s) orally once a day  hydrocodone-acetaminophen 10 mg-325 mg oral tablet: 1 tab(s) orally every 6 hours, As Needed  lansoprazole 30 mg oral delayed release capsule: 1 cap(s) orally once a day  lisinopril 20 mg oral tablet: 1 tab(s) orally once a day  Myrbetriq 25 mg oral tablet, extended release: 1 tab(s) orally once a day  Norvasc 5 mg oral tablet: 1 tab(s) orally once a day  tamsulosin 0.4 mg oral capsule: 1 cap(s) orally once a day  zolpidem 10 mg oral tablet: 1 tab(s) orally once a day (at bedtime)   amlodipine-benazepril 5 mg-20 mg oral capsule: 1 cap(s) orally once a day  escitalopram 20 mg oral tablet: 1 tab(s) orally once a day  famotidine 40 mg oral tablet: 1 tab(s) orally once a day  finasteride 5 mg oral tablet: 1 tab(s) orally once a day  hydrocodone-acetaminophen 10 mg-325 mg oral tablet: 1 tab(s) orally every 6 hours, As Needed  lansoprazole 30 mg oral delayed release capsule: 1 cap(s) orally once a day  lisinopril 20 mg oral tablet: 1 tab(s) orally once a day  Myrbetriq 25 mg oral tablet, extended release: 1 tab(s) orally once a day  Norvasc 5 mg oral tablet: 1 tab(s) orally once a day  predniSONE 10 mg oral tablet: 1 tab orally 4 times a day x 3 days  1 tab orally 2 times a day x 3 days  tamsulosin 0.4 mg oral capsule: 1 cap(s) orally once a day  zolpidem 10 mg oral tablet: 1 tab(s) orally once a day (at bedtime)

## 2021-10-16 NOTE — DISCHARGE NOTE PROVIDER - NSDCCPCAREPLAN_GEN_ALL_CORE_FT
PRINCIPAL DISCHARGE DIAGNOSIS  Diagnosis: COVID-19  Assessment and Plan of Treatment: Coronavirus disease 2019 (COVID-19) is a respiratory illness  that can spread from person to person. The virus that causes  COVID-19 is a novel coronavirus that was first identified during  an investigation into an outbreak in [place], [place].  The virus that causes COVID-19 probably emerged from an  animal source, but is now spreading from person to person.  The virus is thought to spread mainly between people who  are in close contact with one another (within about 6 feet)  through respiratory droplets produced when an infected  person coughs or sneezes. It also may be possible that a person  can get COVID-19 by touching a surface or object that has  the virus on it and then touching their own mouth, nose, or  possibly their eyes, but this is not thought to be the main  way the virus spreads.  Please stay home and avoid contact with others for at least a week after symptoms resolve and follow government guidelines.   Patients with COVID-19 have had mild to severe respiratory  illness with symptoms of  • fever  • cough  • shortness of breath  People can help protect themselves from respiratory illness with  everyday preventive actions.    • Avoid close contact with people who are sick.  • Avoid touching your eyes, nose, and mouth with  unwashed hands.  • Wash your hands often with soap and water for at least 20   seconds. Use an alcohol-based hand  that contains at  least 60% alcohol if soap and water are not available.   Stay home when you are sick.  • Cover your cough or sneeze with a tissue, then throw the  tissue in the trash.  • Clean and disinfect frequently touched objects  and surfaces.  Call 911 and inform them you are covid positive before you decide to go to the emergency room if you have chest pain, difficulty breathing, high fevers, worsening of your symptoms, feel unwell, or have nausea and vomiting.

## 2021-10-16 NOTE — DISCHARGE NOTE PROVIDER - NSDCPNSUBOBJ_GEN_ALL_CORE
Pt was seen and examined at the bedside. resting comfortably.  no complaints at this time.   Oxygen delivered to patient's home.  Wife to  patient today.  f/u with PCP in a week.

## 2021-10-16 NOTE — DISCHARGE NOTE PROVIDER - HOSPITAL COURSE
75 y/o M w/ PMHx of HTN presented to ED for Hypoxia. Patient endorsed Fever, diarrhea, dizziness, weakness which all started October 2nd. Additionally patient was experiencing a non-productive cough. Checked pulse ox at rest and it was in the low 80s so went to  and Tested Positive for COVID on Wednesday 10/13/2021. Patient states he is fully vaccinated since March 2021 Denies in CP, leg swelling, shortness of breath, n/v, abdominal pain.     In ED, VS sig for BP of 95/53, On 95 on 10L NRB, Afebrile, Labs sig for Na of 133, Cr of 1.4, No leukocytosis, COVID PCR Positive, CXR sig for Patchy b/l opacities. Began to desat on NRB, placed on HFNC 50L, 60 FiO2,   Pt was admitted to ED3 Step down.   Pt was started on Toci and given dexamethasone 6mg.  Pt initially required high flow but was weaned down to NC 3L saturating well even when ambulating. Pt is being sent home with 3L oxygen  Pt is hemodynamically stable and ready for discharge.      75 y/o M w/ PMHx of HTN presented to ED for Hypoxia. Patient endorsed Fever, diarrhea, dizziness, weakness which all started October 2nd. Additionally patient was experiencing a non-productive cough. Checked pulse ox at rest and it was in the low 80s so went to  and Tested Positive for COVID on Wednesday 10/13/2021. Patient states he is fully vaccinated since March 2021 Denies in CP, leg swelling, shortness of breath, n/v, abdominal pain.     In ED, VS sig for BP of 95/53, On 95 on 10L NRB, Afebrile, Labs sig for Na of 133, Cr of 1.4, No leukocytosis, COVID PCR Positive, CXR sig for Patchy b/l opacities. Began to desat on NRB, placed on HFNC 50L, 60 FiO2,   Pt was admitted to ED3 Step down.   Pt was started on Toci and given dexamethasone 6mg.  Pt initially required high flow but was weaned down to NC 3L saturating well even when ambulating. Pt is being sent home with 3L oxygen  Pt is hemodynamically stable, afebrile and ready for discharge. Pt educated on quarantine for 14 days after symptoms onset and family also updated about discharge instructions.

## 2021-10-16 NOTE — DISCHARGE NOTE PROVIDER - CARE PROVIDERS DIRECT ADDRESSES
[email or web address]

## 2021-10-16 NOTE — CHART NOTE - NSCHARTNOTEFT_GEN_A_CORE
- Despite treatment with furosemide, antibiotics, and/or steroids, patient is still hypoxic  - Patient saturating _86__ % on room air at rest  - Patient saturating _84__ % on room air with ambulation  - Patient saturating __92_ % on __3_ L O2 via nasal cannula with ambulation  - Patient tested in a chronic and stable state  - Patient is aware they are going home on oxygen

## 2021-10-16 NOTE — PROGRESS NOTE ADULT - ASSESSMENT
Mr Cartagena is a 75 yo Man w/ MHx of HTN presenting to ED for Hypoxia.     #Acute Hypoxic Respiratory Failure 2/2 COVID-19 Pneumonia  In ED, VS sig for BP of 95/53, Afebrile, No leukocytosis,  fully vaccinated since March 2021   placed on HFNC 50L, 50 FiO2 saturating 97%  Monitor Pulse Ox, Titrate O2 as needed  COVID PCR Positive  CXR sig for Patchy b/l opacities  c/w Decadron 6 mg IV Push for 10 days  f/u Inflammatory Marker - ProCal, D-Dimer, Ferritin, ESR, CRP, Trend q48-72h  Tylenol if develops fever  ID Consult appreciated   s/p Toci 10/13 - repeat infl markers jarrett    #DUSTIN vs CKD3a -resolved  Cr on admission 1.4, Cr from Aug 2020 0.7  Start on NS at 75cc/hr x 12 hours  Trend CMP  Avoid Nephrotoxic Agents    #Hypertension  Hypotensive on Admission, BP 95/53  Hold Home BP meds for now (Norvasc, Benazapril)    #BPH  c/w Home Finasteride 5 mg PO qD, Tamsulosin 0.4 mg PO qD    Diet: DASH  DVT pro: Lovenox 40 mg BID (BMI 30)  GI pro: Protonix  Dispo: SDU    Progress Note Handoff:   Pending: oxygen titration, inflammatory markers  Dispo: Home in 24hr after oxygen assessment   Family: house staff updated 
Mr Cartagena is a 75 yo Man w/ MHx of HTN presenting to ED for Hypoxia.     #Acute Hypoxic Respiratory Failure 2/2 COVID-19 Pneumonia  In ED, VS sig for BP of 95/53, Afebrile, No leukocytosis,  fully vaccinated since March 2021   placed on HFNC 50L, 60 FiO2   Monitor Pulse Ox, Titrate O2 as needed  COVID PCR Positive  CXR sig for Patchy b/l opacities  Start on Decadron 6 mg IV Push for 10 days  f/u Inflammatory Marker - ProCal, D-Dimer, Ferritin, ESR, CRP, Trend q48-72h  Tylenol if develops fever  ID Consult appreciated   Toci ordered for today - 10/13    #DUSTIN vs CKD3a  Cr on admission 1.4, Cr from Aug 2020 0.7  Start on NS at 75cc/hr x 12 hours  Trend CMP  Avoid Nephrotoxic Agents    #Hypertension  Hypotensive on Admission, BP 95/53  Hold Home BP meds for now (Norvasc, Benazapril)    #BPH  c/w Home Finasteride 5 mg PO qD, Tamsulosin 0.4 mg PO qD    Diet: DASH  DVT pro: Lovenox 40 mg BID (BMI 30)  GI pro: Protonix  Dispo: SDU    Progress Note Handoff:   Pending: oxygen titration  Dispo: ED3  Family: house staff updated 
Mr Cartagena is a 77 yo Man w/ MHx of HTN presenting to ED for Hypoxia.     #Acute Hypoxic Respiratory Failure 2/2 COVID-19 Pneumonia  In ED, VS sig for BP of 95/53, Afebrile, No leukocytosis,  fully vaccinated since March 2021   placed on HFNC 50L, 50 FiO2 saturating 97%  Monitor Pulse Ox, Titrate O2 as needed  COVID PCR Positive  CXR sig for Patchy b/l opacities  c/w Decadron 6 mg IV Push for 10 days  f/u Inflammatory Marker - ProCal, D-Dimer, Ferritin, ESR, CRP, Trend q48-72h  Tylenol if develops fever  ID Consult appreciated   s/p Toci 10/13 - repeat infl markers jarrett    #DUSTIN vs CKD3a -resolved  Cr on admission 1.4, Cr from Aug 2020 0.7  Start on NS at 75cc/hr x 12 hours  Trend CMP  Avoid Nephrotoxic Agents    #Hypertension  Hypotensive on Admission, BP 95/53  Hold Home BP meds for now (Norvasc, Benazapril)    #BPH  c/w Home Finasteride 5 mg PO qD, Tamsulosin 0.4 mg PO qD    Diet: DASH  DVT pro: Lovenox 40 mg BID (BMI 30)  GI pro: Protonix  Dispo: SDU    Progress Note Handoff:   Pending: oxygen titration, inflammatory markers  Dispo: ED3  Family: house staff updated 
· Assessment	  75 y/o M w/ PMHx of HTN presenting to ED for Hypoxia. Patient endorses Fever, diarrhea, dizziness, weakness which all started October 2nd. Additionally patient was experiencing a non-productive Checked pulse ox today at rest and it was in the low 80s so went to  and Tested Positive for COVID on Wednesday 10/6. Patient states he is fully vaccinated since March 2021 Denies in CP, leg swelling, shortness of breath, n/v, abdominal pain.     In ED : Began to desat on NRB, placed on HFNC 50L, 60 FiO2.    IMPRESSION;  COVID 19 with severe illness. SpO2 < 94% on RA and need for supplemental O2.  Pt is in the late inflammatory response phase ot the illness based on the onset of symptoms.  Inflammatory markers are elevated and suggestive of a cytokine response/cytokine storm.   procalcitonin 0.56 > no bacterial infection  Ferritin 1545    Ddimers 135>114  CXR GGOP    10/13 s/p Toci    RECOMMENDATIONS;  Target SpO2 92 % to 96 %  Dexamethasone 6 mg iv q24h for 10 days.  Monitor for side effects: hyperglycemia, neurological ( agitation/confusion), adrenal suppression, bacterial and fungal infections  Could finish course of steroids with po prednisone 40 mg q24 for a total of 10 days once off HFNC  Anticoagulation as per team.   recall prn please 
Mr Cartagena is a 75 yo Man w/ MHx of HTN presenting to ED for Hypoxia.     #Acute Hypoxic Respiratory Failure 2/2 COVID-19 Pneumonia  In ED, VS sig for BP of 95/53, Afebrile, No leukocytosis,  fully vaccinated since March 2021   placed on NC 3L now saturating 95%. was previously on HFNC  Monitor Pulse Ox, Titrate O2 as needed  COVID PCR Positive  CXR sig for Patchy b/l opacities  c/w Decadron 6 mg IV Push for 10 days  f/u Inflammatory Marker - ProCal, D-Dimer, Ferritin, ESR, CRP, Trend q48-72h  Tylenol if develops fever  ID Consult appreciated   s/p Toci 10/13 - repeat infl markers markedly decreased    #DUSTIN vs CKD3a -resolved  Cr on admission 1.4, Cr from Aug 2020 0.7  Start on NS at 75cc/hr x 12 hours  Trend CMP  Avoid Nephrotoxic Agents    #Hypertension  Hypotensive on Admission, BP 95/53  Hold Home BP meds for now (Norvasc, Benazapril)    #BPH  c/w Home Finasteride 5 mg PO qD, Tamsulosin 0.4 mg PO qD    Diet: DASH  DVT pro: Lovenox 40 mg BID (BMI 30)  GI pro: Protonix  Dispo: SDU    Progress Note Handoff:   Pending: oxygen titration, inflammatory markers  Dispo: Pending home oxygen delivery  Family: house staff updated 
· Assessment	  75 y/o M w/ PMHx of HTN presenting to ED for Hypoxia. Patient endorses Fever, diarrhea, dizziness, weakness which all started October 2nd. Additionally patient was experiencing a non-productive Checked pulse ox today at rest and it was in the low 80s so went to  and Tested Positive for COVID on Wednesday 10/6. Patient states he is fully vaccinated since March 2021 Denies in CP, leg swelling, shortness of breath, n/v, abdominal pain.     In ED : Began to desat on NRB, placed on HFNC 50L, 60 FiO2.    IMPRESSION;  COVID 19 with severe illness. SpO2 < 94% on RA and need for supplemental O2.  Pt is in the late inflammatory response phase ot the illness based on the onset of symptoms.  Inflammatory markers are elevated and suggestive of a cytokine response/cytokine storm.   procalcitonin 0.56 > no bacterial infection  Ferritin 1545    Ddimers 135  CXR GGOP    RECOMMENDATIONS;  Target SpO2 92 % to 96 %  Tocilizumab iv  800 mg once. Ferritin 48h later   Dexamethasone 6 mg iv q24h for 10 days.  Monitor for side effects: hyperglycemia, neurological ( agitation/confusion), adrenal suppression, bacterial and fungal infections  Anticoagulation as per team.   
· Assessment	  77 y/o M w/ PMHx of HTN presenting to ED for Hypoxia. Patient endorses Fever, diarrhea, dizziness, weakness which all started October 2nd. Additionally patient was experiencing a non-productive Checked pulse ox today at rest and it was in the low 80s so went to  and Tested Positive for COVID on Wednesday 10/6. Patient states he is fully vaccinated since March 2021 Denies in CP, leg swelling, shortness of breath, n/v, abdominal pain.     In ED : Began to desat on NRB, placed on HFNC 50L, 60 FiO2.    IMPRESSION;  COVID 19 with severe illness. SpO2 < 94% on RA and need for supplemental O2.  Pt is in the late inflammatory response phase ot the illness based on the onset of symptoms.  Inflammatory markers are elevated and suggestive of a cytokine response/cytokine storm.   procalcitonin 0.56 > no bacterial infection  Ferritin 1545    Ddimers 135  CXR GGOP    10/13 s/p Toci    RECOMMENDATIONS;  Target SpO2 92 % to 96 %  Monitor markers 10/15  Dexamethasone 6 mg iv q24h for 10 days.  Monitor for side effects: hyperglycemia, neurological ( agitation/confusion), adrenal suppression, bacterial and fungal infections  Anticoagulation as per team.

## 2021-10-16 NOTE — PROGRESS NOTE ADULT - PROVIDER SPECIALTY LIST ADULT
Internal Medicine
Infectious Disease

## 2021-10-17 ENCOUNTER — TRANSCRIPTION ENCOUNTER (OUTPATIENT)
Age: 76
End: 2021-10-17

## 2021-10-17 VITALS
RESPIRATION RATE: 18 BRPM | HEART RATE: 70 BPM | DIASTOLIC BLOOD PRESSURE: 67 MMHG | OXYGEN SATURATION: 93 % | TEMPERATURE: 97 F | SYSTOLIC BLOOD PRESSURE: 122 MMHG

## 2021-10-17 LAB
ALBUMIN SERPL ELPH-MCNC: 3.4 G/DL — LOW (ref 3.5–5.2)
ALP SERPL-CCNC: 43 U/L — SIGNIFICANT CHANGE UP (ref 30–115)
ALT FLD-CCNC: 72 U/L — HIGH (ref 0–41)
ANION GAP SERPL CALC-SCNC: 12 MMOL/L — SIGNIFICANT CHANGE UP (ref 7–14)
AST SERPL-CCNC: 56 U/L — HIGH (ref 0–41)
BASOPHILS # BLD AUTO: 0.02 K/UL — SIGNIFICANT CHANGE UP (ref 0–0.2)
BASOPHILS NFR BLD AUTO: 0.4 % — SIGNIFICANT CHANGE UP (ref 0–1)
BILIRUB SERPL-MCNC: 0.8 MG/DL — SIGNIFICANT CHANGE UP (ref 0.2–1.2)
BUN SERPL-MCNC: 24 MG/DL — HIGH (ref 10–20)
CALCIUM SERPL-MCNC: 8.5 MG/DL — SIGNIFICANT CHANGE UP (ref 8.5–10.1)
CHLORIDE SERPL-SCNC: 105 MMOL/L — SIGNIFICANT CHANGE UP (ref 98–110)
CO2 SERPL-SCNC: 21 MMOL/L — SIGNIFICANT CHANGE UP (ref 17–32)
CREAT SERPL-MCNC: 0.9 MG/DL — SIGNIFICANT CHANGE UP (ref 0.7–1.5)
EOSINOPHIL # BLD AUTO: 0.19 K/UL — SIGNIFICANT CHANGE UP (ref 0–0.7)
EOSINOPHIL NFR BLD AUTO: 4 % — SIGNIFICANT CHANGE UP (ref 0–8)
GLUCOSE SERPL-MCNC: 100 MG/DL — HIGH (ref 70–99)
HCT VFR BLD CALC: 47.4 % — SIGNIFICANT CHANGE UP (ref 42–52)
HGB BLD-MCNC: 15.6 G/DL — SIGNIFICANT CHANGE UP (ref 14–18)
IMM GRANULOCYTES NFR BLD AUTO: 6.6 % — HIGH (ref 0.1–0.3)
LYMPHOCYTES # BLD AUTO: 0.38 K/UL — LOW (ref 1.2–3.4)
LYMPHOCYTES # BLD AUTO: 8.1 % — LOW (ref 20.5–51.1)
MAGNESIUM SERPL-MCNC: 2.2 MG/DL — SIGNIFICANT CHANGE UP (ref 1.8–2.4)
MCHC RBC-ENTMCNC: 28.9 PG — SIGNIFICANT CHANGE UP (ref 27–31)
MCHC RBC-ENTMCNC: 32.9 G/DL — SIGNIFICANT CHANGE UP (ref 32–37)
MCV RBC AUTO: 87.9 FL — SIGNIFICANT CHANGE UP (ref 80–94)
MONOCYTES # BLD AUTO: 0.23 K/UL — SIGNIFICANT CHANGE UP (ref 0.1–0.6)
MONOCYTES NFR BLD AUTO: 4.9 % — SIGNIFICANT CHANGE UP (ref 1.7–9.3)
NEUTROPHILS # BLD AUTO: 3.59 K/UL — SIGNIFICANT CHANGE UP (ref 1.4–6.5)
NEUTROPHILS NFR BLD AUTO: 76 % — HIGH (ref 42.2–75.2)
NRBC # BLD: 0 /100 WBCS — SIGNIFICANT CHANGE UP (ref 0–0)
PLATELET # BLD AUTO: 283 K/UL — SIGNIFICANT CHANGE UP (ref 130–400)
POTASSIUM SERPL-MCNC: 4.6 MMOL/L — SIGNIFICANT CHANGE UP (ref 3.5–5)
POTASSIUM SERPL-SCNC: 4.6 MMOL/L — SIGNIFICANT CHANGE UP (ref 3.5–5)
PROT SERPL-MCNC: 5.6 G/DL — LOW (ref 6–8)
RBC # BLD: 5.39 M/UL — SIGNIFICANT CHANGE UP (ref 4.7–6.1)
RBC # FLD: 13.1 % — SIGNIFICANT CHANGE UP (ref 11.5–14.5)
SODIUM SERPL-SCNC: 138 MMOL/L — SIGNIFICANT CHANGE UP (ref 135–146)
WBC # BLD: 4.72 K/UL — LOW (ref 4.8–10.8)
WBC # FLD AUTO: 4.72 K/UL — LOW (ref 4.8–10.8)

## 2021-10-17 PROCEDURE — 99239 HOSP IP/OBS DSCHRG MGMT >30: CPT

## 2021-10-17 PROCEDURE — 71045 X-RAY EXAM CHEST 1 VIEW: CPT | Mod: 26

## 2021-10-17 RX ADMIN — AMLODIPINE BESYLATE 5 MILLIGRAM(S): 2.5 TABLET ORAL at 07:03

## 2021-10-17 RX ADMIN — Medication 6 MILLIGRAM(S): at 07:03

## 2021-10-17 RX ADMIN — LISINOPRIL 20 MILLIGRAM(S): 2.5 TABLET ORAL at 07:03

## 2021-10-17 RX ADMIN — ESCITALOPRAM OXALATE 20 MILLIGRAM(S): 10 TABLET, FILM COATED ORAL at 11:36

## 2021-10-17 RX ADMIN — ENOXAPARIN SODIUM 40 MILLIGRAM(S): 100 INJECTION SUBCUTANEOUS at 07:03

## 2021-10-17 RX ADMIN — FINASTERIDE 5 MILLIGRAM(S): 5 TABLET, FILM COATED ORAL at 11:35

## 2021-10-17 RX ADMIN — PANTOPRAZOLE SODIUM 40 MILLIGRAM(S): 20 TABLET, DELAYED RELEASE ORAL at 07:04

## 2021-10-17 RX ADMIN — FAMOTIDINE 40 MILLIGRAM(S): 10 INJECTION INTRAVENOUS at 10:27

## 2021-10-30 DIAGNOSIS — U07.1 COVID-19: ICD-10-CM

## 2021-10-30 DIAGNOSIS — Z87.891 PERSONAL HISTORY OF NICOTINE DEPENDENCE: ICD-10-CM

## 2021-10-30 DIAGNOSIS — I95.9 HYPOTENSION, UNSPECIFIED: ICD-10-CM

## 2021-10-30 DIAGNOSIS — Z91.013 ALLERGY TO SEAFOOD: ICD-10-CM

## 2021-10-30 DIAGNOSIS — N18.31 CHRONIC KIDNEY DISEASE, STAGE 3A: ICD-10-CM

## 2021-10-30 DIAGNOSIS — J12.82 PNEUMONIA DUE TO CORONAVIRUS DISEASE 2019: ICD-10-CM

## 2021-10-30 DIAGNOSIS — R06.02 SHORTNESS OF BREATH: ICD-10-CM

## 2021-10-30 DIAGNOSIS — N40.0 BENIGN PROSTATIC HYPERPLASIA WITHOUT LOWER URINARY TRACT SYMPTOMS: ICD-10-CM

## 2021-10-30 DIAGNOSIS — K22.70 BARRETT'S ESOPHAGUS WITHOUT DYSPLASIA: ICD-10-CM

## 2021-10-30 DIAGNOSIS — J96.01 ACUTE RESPIRATORY FAILURE WITH HYPOXIA: ICD-10-CM

## 2021-10-30 DIAGNOSIS — N17.9 ACUTE KIDNEY FAILURE, UNSPECIFIED: ICD-10-CM

## 2021-10-30 DIAGNOSIS — I12.9 HYPERTENSIVE CHRONIC KIDNEY DISEASE WITH STAGE 1 THROUGH STAGE 4 CHRONIC KIDNEY DISEASE, OR UNSPECIFIED CHRONIC KIDNEY DISEASE: ICD-10-CM

## 2021-11-16 ENCOUNTER — TRANSCRIPTION ENCOUNTER (OUTPATIENT)
Age: 76
End: 2021-11-16

## 2021-11-24 ENCOUNTER — OUTPATIENT (OUTPATIENT)
Dept: OUTPATIENT SERVICES | Facility: HOSPITAL | Age: 76
LOS: 1 days | Discharge: HOME | End: 2021-11-24
Payer: MEDICARE

## 2021-11-24 DIAGNOSIS — R07.9 CHEST PAIN, UNSPECIFIED: ICD-10-CM

## 2021-11-24 DIAGNOSIS — Z98.890 OTHER SPECIFIED POSTPROCEDURAL STATES: Chronic | ICD-10-CM

## 2021-11-24 PROCEDURE — 71046 X-RAY EXAM CHEST 2 VIEWS: CPT | Mod: 26

## 2023-01-01 NOTE — ED ADULT NURSE NOTE - NSICDXFAMILYHX_GEN_ALL_CORE_FT
Goal Outcome Evaluation:           Progress: improving            
FAMILY HISTORY:  FH: heart disease

## 2023-09-17 NOTE — ED ADULT NURSE NOTE - NSHOSCREENINGQ1_ED_ALL_ED
Please return to the emergency department in 7-10 days for stitch removal. You can also follow up with your primary care provider for removal. Return if you develop any new or concerning signs for infection or pain
No

## 2023-12-26 ENCOUNTER — APPOINTMENT (OUTPATIENT)
Dept: PULMONOLOGY | Facility: CLINIC | Age: 78
End: 2023-12-26
Payer: MEDICARE

## 2023-12-26 VITALS
DIASTOLIC BLOOD PRESSURE: 50 MMHG | SYSTOLIC BLOOD PRESSURE: 135 MMHG | OXYGEN SATURATION: 95 % | WEIGHT: 190 LBS | HEART RATE: 65 BPM

## 2023-12-26 DIAGNOSIS — K21.9 GASTRO-ESOPHAGEAL REFLUX DISEASE W/OUT ESOPHAGITIS: ICD-10-CM

## 2023-12-26 DIAGNOSIS — G47.33 OBSTRUCTIVE SLEEP APNEA (ADULT) (PEDIATRIC): ICD-10-CM

## 2023-12-26 DIAGNOSIS — Z78.9 OTHER SPECIFIED HEALTH STATUS: ICD-10-CM

## 2023-12-26 PROCEDURE — 99204 OFFICE O/P NEW MOD 45 MIN: CPT

## 2023-12-26 RX ORDER — FLUTICASONE PROPIONATE 50 UG/1
50 SPRAY, METERED NASAL TWICE DAILY
Qty: 1 | Refills: 5 | Status: ACTIVE | COMMUNITY
Start: 2023-12-26 | End: 1900-01-01

## 2023-12-26 RX ORDER — AZELASTINE HYDROCHLORIDE 137 UG/1
0.1 SPRAY, METERED NASAL DAILY
Qty: 1 | Refills: 5 | Status: ACTIVE | COMMUNITY
Start: 2023-12-26 | End: 1900-01-01

## 2023-12-26 NOTE — HISTORY OF PRESENT ILLNESS
[TextBox_4] : 78 years old presented for above severe COVID a year and a half ago was hospitalized discharged on oxygen not better however complaining of persistent cough postnasal drip reflux remote history of smoking last PFTs 2019 was negative.  He also report NOÉ symptoms snoring EDS.  He is followed by cardiologist cardiac workup negative he is complaining of shortness of breath on exertion going up the stairs.

## 2023-12-26 NOTE — DISCUSSION/SUMMARY
[FreeTextEntry1] : Chronic cough multifactorial Postnasal drip GERD History of severe COVID Shortness of breath on exertion chest CT/PFTs/pulse ox on exertion Highly NOÉ Home sleep study

## 2023-12-28 ENCOUNTER — NON-APPOINTMENT (OUTPATIENT)
Age: 78
End: 2023-12-28

## 2024-02-01 ENCOUNTER — APPOINTMENT (OUTPATIENT)
Dept: PULMONOLOGY | Facility: CLINIC | Age: 79
End: 2024-02-01
Payer: MEDICARE

## 2024-02-01 PROCEDURE — 94727 GAS DIL/WSHOT DETER LNG VOL: CPT

## 2024-02-01 PROCEDURE — 94729 DIFFUSING CAPACITY: CPT

## 2024-02-01 PROCEDURE — 94010 BREATHING CAPACITY TEST: CPT

## 2024-02-22 ENCOUNTER — APPOINTMENT (OUTPATIENT)
Dept: PULMONOLOGY | Facility: CLINIC | Age: 79
End: 2024-02-22
Payer: MEDICARE

## 2024-02-22 DIAGNOSIS — R06.02 SHORTNESS OF BREATH: ICD-10-CM

## 2024-02-22 DIAGNOSIS — R05.3 CHRONIC COUGH: ICD-10-CM

## 2024-02-22 PROCEDURE — 99441: CPT | Mod: 93

## 2024-02-22 NOTE — REASON FOR VISIT
[Home] : at home, [unfilled] , at the time of the visit. [Medical Office: ([facility])___] : at the medical office located in  [Follow-Up] : a follow-up visit [Abnormal CXR/ Chest CT] : an abnormal CXR/ chest CT [COPD] : COPD

## 2024-02-22 NOTE — DISCUSSION/SUMMARY
[FreeTextEntry1] : Chronic cough multifactorial Postnasal drip History of severe COVID Shortness of breath on exertion chest CT/PFTs/pulse ox on exertion CHEST CT/ PFT NOTED

## 2024-05-09 NOTE — ED PROVIDER NOTE - PROGRESS NOTE DETAILS
Pt hypoxic to 84% on RA at rest. Goes to 89% on 6L NC. Called respiratory and requested HFNC. Pt told that he will need to stay in the hospital for respiratory support. -sally No Pt 98% on 63%o2 and 50L/min -[place] Approved for SDU by ICU fellow

## 2024-11-01 ENCOUNTER — APPOINTMENT (OUTPATIENT)
Dept: NEUROLOGY | Facility: CLINIC | Age: 79
End: 2024-11-01
Payer: MEDICARE

## 2024-11-01 PROCEDURE — 95886 MUSC TEST DONE W/N TEST COMP: CPT

## 2024-11-01 PROCEDURE — 95912 NRV CNDJ TEST 11-12 STUDIES: CPT

## 2025-07-16 ENCOUNTER — APPOINTMENT (OUTPATIENT)
Dept: PULMONOLOGY | Facility: CLINIC | Age: 80
End: 2025-07-16
Payer: MEDICARE

## 2025-07-16 VITALS
WEIGHT: 190 LBS | RESPIRATION RATE: 15 BRPM | HEART RATE: 66 BPM | DIASTOLIC BLOOD PRESSURE: 72 MMHG | OXYGEN SATURATION: 97 % | SYSTOLIC BLOOD PRESSURE: 130 MMHG

## 2025-07-16 PROCEDURE — 99213 OFFICE O/P EST LOW 20 MIN: CPT

## 2025-07-16 PROCEDURE — G2211 COMPLEX E/M VISIT ADD ON: CPT

## 2025-07-16 RX ORDER — AZELASTINE HYDROCHLORIDE 137 UG/1
0.1 SPRAY, METERED NASAL
Qty: 30 | Refills: 5 | Status: ACTIVE | COMMUNITY
Start: 2025-07-16 | End: 1900-01-01

## 2025-07-16 RX ORDER — FLUTICASONE PROPIONATE 50 UG/1
50 SPRAY NASAL
Qty: 1 | Refills: 5 | Status: ACTIVE | COMMUNITY
Start: 2025-07-16 | End: 1900-01-01